# Patient Record
Sex: FEMALE | Race: WHITE | NOT HISPANIC OR LATINO | Employment: FULL TIME | URBAN - METROPOLITAN AREA
[De-identification: names, ages, dates, MRNs, and addresses within clinical notes are randomized per-mention and may not be internally consistent; named-entity substitution may affect disease eponyms.]

---

## 2017-03-10 ENCOUNTER — GENERIC CONVERSION - ENCOUNTER (OUTPATIENT)
Dept: OTHER | Facility: OTHER | Age: 52
End: 2017-03-10

## 2017-10-16 ENCOUNTER — ALLSCRIPTS OFFICE VISIT (OUTPATIENT)
Dept: OTHER | Facility: OTHER | Age: 52
End: 2017-10-16

## 2017-10-16 DIAGNOSIS — Z12.31 ENCOUNTER FOR SCREENING MAMMOGRAM FOR MALIGNANT NEOPLASM OF BREAST: ICD-10-CM

## 2017-12-05 ENCOUNTER — HOSPITAL ENCOUNTER (OUTPATIENT)
Dept: MAMMOGRAPHY | Facility: MEDICAL CENTER | Age: 52
Discharge: HOME | End: 2017-12-05
Payer: COMMERCIAL

## 2017-12-05 DIAGNOSIS — Z12.31 ENCOUNTER FOR SCREENING MAMMOGRAM FOR MALIGNANT NEOPLASM OF BREAST: ICD-10-CM

## 2017-12-05 PROCEDURE — G0202 SCR MAMMO BI INCL CAD: HCPCS

## 2017-12-05 PROCEDURE — 77063 BREAST TOMOSYNTHESIS BI: CPT

## 2017-12-12 DIAGNOSIS — R92.8 OTHER ABNORMAL AND INCONCLUSIVE FINDINGS ON DIAGNOSTIC IMAGING OF BREAST: ICD-10-CM

## 2017-12-13 ENCOUNTER — CONVERSION ENCOUNTER (OUTPATIENT)
Dept: MAMMOGRAPHY | Facility: CLINIC | Age: 52
End: 2017-12-13

## 2017-12-13 ENCOUNTER — HOSPITAL ENCOUNTER (OUTPATIENT)
Dept: ULTRASOUND IMAGING | Facility: CLINIC | Age: 52
Discharge: HOME | End: 2017-12-13
Payer: COMMERCIAL

## 2017-12-13 ENCOUNTER — HOSPITAL ENCOUNTER (OUTPATIENT)
Dept: MAMMOGRAPHY | Facility: CLINIC | Age: 52
Discharge: HOME | End: 2017-12-13
Payer: COMMERCIAL

## 2017-12-13 ENCOUNTER — TRANSCRIBE ORDERS (OUTPATIENT)
Dept: MAMMOGRAPHY | Facility: CLINIC | Age: 52
End: 2017-12-13

## 2017-12-13 ENCOUNTER — GENERIC CONVERSION - ENCOUNTER (OUTPATIENT)
Dept: OTHER | Facility: OTHER | Age: 52
End: 2017-12-13

## 2017-12-13 DIAGNOSIS — N63.0 LUMP OR MASS IN BREAST: Primary | ICD-10-CM

## 2017-12-13 DIAGNOSIS — R92.8 OTHER ABNORMAL AND INCONCLUSIVE FINDINGS ON DIAGNOSTIC IMAGING OF BREAST: ICD-10-CM

## 2017-12-13 DIAGNOSIS — R92.8 ABNORMAL MAMMOGRAM: ICD-10-CM

## 2017-12-13 PROCEDURE — 76642 ULTRASOUND BREAST LIMITED: CPT

## 2017-12-22 ENCOUNTER — GENERIC CONVERSION - ENCOUNTER (OUTPATIENT)
Dept: OTHER | Facility: OTHER | Age: 52
End: 2017-12-22

## 2018-01-11 NOTE — RESULT NOTES
Message   Thyroid levels looked okay  No changes recommended at this time  Verified Results  (1) TSH WITH FT4 REFLEX 10Oct2016 06:09PM Apoorva Velasco     Test Name Result Flag Reference   TSH 0 392 uIU/mL  0 358-3 740   Patients undergoing fluorescein dye angiography may retain small amounts of fluorescein in the body for 48-72 hours post procedure  Samples containing fluorescein can produce falsely depressed TSH values  If the patient had this procedure,a specimen should be resubmitted post fluorescein clearance            The recommended reference ranges for TSH during pregnancy are as follows:  First trimester 0 1 to 2 5 uIU/mL  Second trimester  0 2 to 3 0 uIU/mL  Third trimester 0 3 to 3 0 uIU/m

## 2018-01-13 NOTE — PROGRESS NOTES
Assessment    1  Encounter for preventive health examination (V70 0) (Z00 00)    Plan  Colon cancer screening    · COLONOSCOPY; Status:Active - Retrospective By Protocol Authorization; Requested  for:[id number];   Generalized anxiety disorder    · From  FLUoxetine HCl - 20 MG Oral Capsule TAKE 1 CAPSULE DAILY for  anxiety To FLUoxetine HCl - 20 MG Oral Capsule take 1 capsule by mouth once daily  Visit for screening mammogram    · MAMMO SCREENING BILATERAL W 3D & CAD; Status:Hold For -  Scheduling,Retrospective By Protocol Authorization; Requested for:[id number];     Discussion/Summary  health maintenance visit the risks and benefits of cervical cancer screening were discussed cervical cancer screening is current      Chief Complaint  Physical      History of Present Illness  HM, Adult Female: The patient is being seen for a health maintenance evaluation  General Health: The patient's health since the last visit is described as good  She has regular dental visits  She denies vision problems  She denies hearing loss  Immunizations status: up to date  Lifestyle:  She consumes a diverse and healthy diet  She does not have any weight concerns  She exercises regularly  She does not use tobacco  She denies alcohol use  She denies drug use  Reproductive health:  she reports normal menses  Screening: cancer screening reviewed and current  metabolic screening reviewed and current  risk screening reviewed and current  Review of Systems    Constitutional: No fever, no chills, feels well, no tiredness, no recent weight gain or weight loss  Eyes: No complaints of eye pain, no red eyes, no eyesight problems, no discharge, no dry eyes, no itching of eyes  ENT: no complaints of earache, no loss of hearing, no nose bleeds, no nasal discharge, no sore throat, no hoarseness     Cardiovascular: No complaints of slow heart rate, no fast heart rate, no chest pain, no palpitations, no leg claudication, no lower extremity edema  Respiratory: No complaints of shortness of breath, no wheezing, no cough, no SOB on exertion, no orthopnea, no PND  Gastrointestinal: No complaints of abdominal pain, no constipation, no nausea or vomiting, no diarrhea, no bloody stools  Genitourinary: No complaints of dysuria, no incontinence, no pelvic pain, no dysmenorrhea, no vaginal discharge or bleeding  Musculoskeletal: No complaints of arthralgias, no myalgias, no joint swelling or stiffness, no limb pain or swelling  Integumentary: No complaints of skin rash or lesions, no itching, no skin wounds, no breast pain or lump  Neurological: No complaints of headache, no confusion, no convulsions, no numbness, no dizziness or fainting, no tingling, no limb weakness, no difficulty walking  Psychiatric: Not suicidal, no sleep disturbance, no anxiety or depression, no change in personality, no emotional problems  Endocrine: No complaints of proptosis, no hot flashes, no muscle weakness, no deepening of the voice, no feelings of weakness  Hematologic/Lymphatic: No complaints of swollen glands, no swollen glands in the neck, does not bleed easily, does not bruise easily  Active Problems    1  Benign paroxysmal vertigo, unspecified laterality (386 11) (H81 10)   2  Cervical cancer screening (V76 2) (Z12 4)   3  Chest pain (786 50) (R07 9)   4  Closed nondisplaced fracture of medial condyle of right humerus, initial encounter   (812 43) (S42 464A)   5  Colon cancer screening (V76 51) (Z12 11)   6  Contact dermatitis (692 9) (L25 9)   7  Epicondylitis elbow, medial, right (726 31) (M77 01)   8  Generalized anxiety disorder (300 02) (F41 1)   9  Hashimoto's thyroiditis (245 2) (E06 3)   10  Hypothyroidism (244 9) (E03 9)   11  Lung mass (786 6) (R91 8)   12  Lung nodules (793 19) (R91 8)   13  Panic disorder without agoraphobia with mild panic attacks (300 01) (F41 0)   14  Pre-employment examination (V70 5) (Z02 1)   15   Puncture Wound Of Foot (892 0)   16  Right elbow pain (719 42) (M25 521)   17  Solar dermatitis (692 70) (L57 8)   18  Stress incontinence, female (625 6) (N39 3)   19  Tinea corporis (110 5) (B35 4)   20  Viral gastroenteritis (008 8) (A08 4)   21  Visit for screening mammogram (V76 12) (Z12 31)    Past Medical History    · Acute bronchitis (466 0) (J20 9)   · Acute sinusitis (461 9) (J01 90)   · Acute sinusitis (461 9) (J01 90)    Surgical History    · History of Salpingo-oophorectomy Bilateral   · History of Total Abdominal Hysterectomy    Family History  Mother    · No pertinent family history  Father    · No pertinent family history    Social History    · Current Every Day Smoker (305 1)   · Current tobacco use (305 1) (Z72 0)   · Never Drank Alcohol    Current Meds   1  ALPRAZolam 0 25 MG Oral Tablet; 1 tablet every 8 hours as needed for anxiety; Therapy: 55Sxb6096 to (Last Rx:11Nov2015) Ordered   2  FLUoxetine HCl - 20 MG Oral Capsule; TAKE 1 CAPSULE DAILY for anxiety; Therapy: 30RDA7533 to (96 240274)  Requested for: 24QGD0212; Last   Rx:10Oct2016 Ordered   3  Levothyroxine Sodium 100 MCG Oral Tablet; TAKE 1 TABLET DAILY AS DIRECTED    Requested for: 93Sem1093; Last Rx:87Zou3419 Ordered   4  Meloxicam 15 MG Oral Tablet; TAKE 1 TABLET DAILY AS NEEDED FOR PAIN WITH   FOOD; Therapy: 86NDP5631 to (Last Rx:47Tln9548)  Requested for: 53Gko0000 Ordered   5  Topiramate 25 MG Oral Tablet; 4 tabs qd until rx completed; Therapy: (Recorded:23Wob7085) to Recorded    Allergies    1  No Known Drug Allergies    Vitals   Recorded: 36DNL6538 04:03PM   Temperature 92 9 F   Systolic 389   Diastolic 80   Height 5 ft 0 5 in   Weight 154 lb    BMI Calculated 29 58   BSA Calculated 1 68     Physical Exam    Constitutional   General appearance: No acute distress, well appearing and well nourished  Eyes   Conjunctiva and lids: No swelling, erythema or discharge  Pupils and irises: Equal, round and reactive to light  Ears, Nose, Mouth, and Throat   External inspection of ears and nose: Normal     Otoscopic examination: Tympanic membranes translucent with normal light reflex  Canals patent without erythema  Oropharynx: Normal with no erythema, edema, exudate or lesions  Pulmonary   Respiratory effort: No increased work of breathing or signs of respiratory distress  Auscultation of lungs: Clear to auscultation  Cardiovascular   Palpation of heart: Normal PMI, no thrills  Auscultation of heart: Normal rate and rhythm, normal S1 and S2, without murmurs  Examination of extremities for edema and/or varicosities: Normal     Abdomen   Abdomen: Non-tender, no masses  Liver and spleen: No hepatomegaly or splenomegaly  Lymphatic   Palpation of lymph nodes in neck: No lymphadenopathy  Musculoskeletal   Gait and station: Normal     Digits and nails: Normal without clubbing or cyanosis  Inspection/palpation of joints, bones, and muscles: Normal     Skin   Skin and subcutaneous tissue: Normal without rashes or lesions  Neurologic   Cranial nerves: Cranial nerves 2-12 intact  Reflexes: 2+ and symmetric  Sensation: No sensory loss  Psychiatric   Orientation to person, place, and time: Normal     Mood and affect: Normal        Health Management  Cervical cancer screening   (1) THIN PREP PAP WITH IMAGING; every 3 years; Next Due: 83QXD5983; Overdue  Colon cancer screening   COLONOSCOPY; every 10 years; Next Due: 54VGH4083; Overdue  Visit for screening mammogram   Digital Bilateral Screening Mammogram With CAD; every 1 year; Last 12OOP6601; Next  Due: 20BQG5676;  Overdue    Signatures   Electronically signed by : Javan Mclcain DO; Oct 16 2017  4:29PM EST                       (Author)

## 2018-01-14 VITALS
HEIGHT: 61 IN | BODY MASS INDEX: 29.07 KG/M2 | SYSTOLIC BLOOD PRESSURE: 120 MMHG | DIASTOLIC BLOOD PRESSURE: 80 MMHG | TEMPERATURE: 97.9 F | WEIGHT: 154 LBS

## 2018-01-23 NOTE — RESULT NOTES
Discussion/Summary   Ultrasound shows what appears to be two complex cysts  Repeat ultrasound recommended for 6 months  Verified Results  *US BREAST LEFT LIMITED (DIAGNOSTIC) 52PEF4253 11:51AM Lambert Quintana     Test Name Result Flag Reference   US BREAST LEFT LIMITED (Report)     Patient History:   No known family history of cancer  No Hormone Replacement Therapy   Patient is an every day smoker  Patient's BMI is 29 7  Reason for exam: addl evaluation requested from abnormal    screening  Callback from screening mammogram     Mammo Diagnostic Left W DBT and CAD: December 13, 2017 - Check In   #: [de-identified]     CC and MLO view(s) were taken of the left breast        Technologist: Rayo Chavez RT(R)(M)   The breast tissue is almost entirely fat  The screening    mammogram report described a subcentimeter mass at the 12 to 1    o'clock position left breast  Tomographic images demonstrate    persistence of this mass within the upper outer left breast    anterior 3rd in depth  A 2nd subcentimeter similar-appearing    mass is also present in the adjacent upper inner left breast    anterior 3rd in depth  Further evaluation of the upper outer    quadrant with ultrasound is recommended  Targeted sonographic evaluation of the 12 o'clock position left    breast 7 cm from the nipple reveals a hypoechoic lesion measuring   4 x 2 x 3 mm with a few low level internal echoes  No definite    hilar flow to suggest a lymph node  There is an adjacent vessel  Findings may represent a small complicated cyst versus lymph    node  Six-month follow-up ultrasound is recommended to confirm    stability/evaluate for resolution  At the 12 o'clock position 3 to 4 cm from the nipple there is a    hypoechoic lesion measuring 4 x 3 x 2 mm  This has low level    internal echoes without definite hilar flow  Findings also are    suggestive of a small complicated cyst versus lymph node      Six-month follow-up ultrasound is recommended to confirm    stability/evaluate for resolution of this finding  US Breast Left Limited: December 13, 2017 - Check In #: [de-identified]   Standard views  Technologist: Jessica Calle RDMS   Fat lobules and uniformly echogenic bands of supporting    structures (Saurav's ligaments) comprise the bulk of breast    tissue  ACR BI-RADSï¾® Assessments: BiRad:3 - Probably Benign (Overall)   Left breast Lt Diag Mammo: BiRad:3 - probably benign finding in    the left breast    Left breast Left Brst US: BiRad:3 - probably benign finding in    the left breast      Recommendation:   Ultrasound of the left breast in 6 months  The patient is scheduled in a reminder system for screening    mammography       Transcription Location: [facility] 98: [id number]     Risk Value(s):   Tyrer-Cuzick 10 Year: 1 900%, Tyrer-Cuzick Lifetime: 6 900%,    Myriad Table: 1 5%, IRWIN 5 Year: 0 9%, NCI Lifetime: 7 0%   Signed by:   Sven Garcia MD   12/13/17

## 2018-02-22 ENCOUNTER — OFFICE VISIT (OUTPATIENT)
Dept: FAMILY MEDICINE CLINIC | Facility: CLINIC | Age: 53
End: 2018-02-22
Payer: COMMERCIAL

## 2018-02-22 VITALS
WEIGHT: 293 LBS | DIASTOLIC BLOOD PRESSURE: 88 MMHG | SYSTOLIC BLOOD PRESSURE: 138 MMHG | TEMPERATURE: 97.3 F | HEIGHT: 60 IN | BODY MASS INDEX: 57.52 KG/M2

## 2018-02-22 DIAGNOSIS — G43.909 MIGRAINE WITHOUT STATUS MIGRAINOSUS, NOT INTRACTABLE, UNSPECIFIED MIGRAINE TYPE: ICD-10-CM

## 2018-02-22 DIAGNOSIS — E03.9 HYPOTHYROIDISM, UNSPECIFIED TYPE: ICD-10-CM

## 2018-02-22 DIAGNOSIS — F41.1 GENERALIZED ANXIETY DISORDER: Primary | ICD-10-CM

## 2018-02-22 PROCEDURE — 99214 OFFICE O/P EST MOD 30 MIN: CPT | Performed by: FAMILY MEDICINE

## 2018-02-22 PROCEDURE — 3008F BODY MASS INDEX DOCD: CPT | Performed by: FAMILY MEDICINE

## 2018-02-22 RX ORDER — LEVOTHYROXINE SODIUM 0.1 MG/1
100 TABLET ORAL DAILY
Qty: 90 TABLET | Refills: 3 | Status: SHIPPED | OUTPATIENT
Start: 2018-02-22 | End: 2018-09-18 | Stop reason: SDUPTHER

## 2018-02-22 RX ORDER — FLUOXETINE HYDROCHLORIDE 20 MG/1
1 CAPSULE ORAL DAILY
COMMUNITY
Start: 2016-10-10 | End: 2018-02-22 | Stop reason: SDUPTHER

## 2018-02-22 RX ORDER — RIZATRIPTAN BENZOATE 10 MG/1
10 TABLET, ORALLY DISINTEGRATING ORAL ONCE AS NEEDED
Qty: 9 TABLET | Refills: 3 | Status: SHIPPED | OUTPATIENT
Start: 2018-02-22 | End: 2018-09-17

## 2018-02-22 RX ORDER — LEVOTHYROXINE SODIUM 0.1 MG/1
1 TABLET ORAL DAILY
COMMUNITY
End: 2018-02-22 | Stop reason: SDUPTHER

## 2018-02-22 RX ORDER — FLUOXETINE HYDROCHLORIDE 20 MG/1
20 CAPSULE ORAL DAILY
Qty: 90 CAPSULE | Refills: 3 | Status: SHIPPED | OUTPATIENT
Start: 2018-02-22 | End: 2019-02-06 | Stop reason: SDUPTHER

## 2018-06-20 ENCOUNTER — TRANSCRIBE ORDERS (OUTPATIENT)
Dept: MAMMOGRAPHY | Facility: CLINIC | Age: 53
End: 2018-06-20

## 2018-06-20 ENCOUNTER — HOSPITAL ENCOUNTER (OUTPATIENT)
Dept: ULTRASOUND IMAGING | Facility: CLINIC | Age: 53
Discharge: HOME | End: 2018-06-20
Payer: COMMERCIAL

## 2018-06-20 DIAGNOSIS — R92.8 ABNORMAL MAMMOGRAM: Primary | ICD-10-CM

## 2018-06-20 DIAGNOSIS — R92.8 OTHER ABNORMAL AND INCONCLUSIVE FINDINGS ON DIAGNOSTIC IMAGING OF BREAST: ICD-10-CM

## 2018-06-20 PROCEDURE — 76642 ULTRASOUND BREAST LIMITED: CPT

## 2018-07-02 ENCOUNTER — TELEPHONE (OUTPATIENT)
Dept: FAMILY MEDICINE CLINIC | Facility: CLINIC | Age: 53
End: 2018-07-02

## 2018-07-02 NOTE — TELEPHONE ENCOUNTER
Spoke with patient for clarification of results  ----- Message from Deyvi Westfall DO sent at 6/20/2018  9:33 AM EDT -----  Left breast US " probably benign "  Recommend diagnostic Mammogram and Left Breast US in 6 months

## 2018-09-17 ENCOUNTER — OFFICE VISIT (OUTPATIENT)
Dept: FAMILY MEDICINE CLINIC | Facility: CLINIC | Age: 53
End: 2018-09-17
Payer: COMMERCIAL

## 2018-09-17 VITALS
TEMPERATURE: 97.9 F | WEIGHT: 141 LBS | DIASTOLIC BLOOD PRESSURE: 82 MMHG | SYSTOLIC BLOOD PRESSURE: 122 MMHG | BODY MASS INDEX: 27.54 KG/M2

## 2018-09-17 DIAGNOSIS — R73.09 ELEVATED GLUCOSE: ICD-10-CM

## 2018-09-17 DIAGNOSIS — E03.9 HYPOTHYROIDISM, UNSPECIFIED TYPE: ICD-10-CM

## 2018-09-17 DIAGNOSIS — Z00.00 WELL ADULT EXAM: Primary | ICD-10-CM

## 2018-09-17 LAB
EST. AVERAGE GLUCOSE BLD GHB EST-MCNC: 148 MG/DL
HBA1C MFR BLD: 6.8 % (ref 4.2–6.3)

## 2018-09-17 PROCEDURE — 83036 HEMOGLOBIN GLYCOSYLATED A1C: CPT | Performed by: FAMILY MEDICINE

## 2018-09-17 PROCEDURE — 84439 ASSAY OF FREE THYROXINE: CPT | Performed by: FAMILY MEDICINE

## 2018-09-17 PROCEDURE — 99396 PREV VISIT EST AGE 40-64: CPT | Performed by: FAMILY MEDICINE

## 2018-09-17 PROCEDURE — 84443 ASSAY THYROID STIM HORMONE: CPT | Performed by: FAMILY MEDICINE

## 2018-09-18 DIAGNOSIS — E03.9 HYPOTHYROIDISM, UNSPECIFIED TYPE: ICD-10-CM

## 2018-09-18 LAB
T4 FREE SERPL-MCNC: 1.08 NG/DL (ref 0.76–1.46)
TSH SERPL DL<=0.05 MIU/L-ACNC: 0.32 UIU/ML (ref 0.36–3.74)

## 2018-09-18 RX ORDER — LEVOTHYROXINE SODIUM 88 UG/1
88 TABLET ORAL DAILY
Qty: 90 TABLET | Refills: 1 | Status: SHIPPED | OUTPATIENT
Start: 2018-09-18 | End: 2019-03-03 | Stop reason: SDUPTHER

## 2018-10-03 NOTE — PROGRESS NOTES
Assessment/Plan:  Anticipatory guidance provided  Recommend return to office for recheck in 1 year  Recommend annual mammography  Colon cancer screening every 10 years with colonoscopy  No problem-specific Assessment & Plan notes found for this encounter  Diagnoses and all orders for this visit:    Well adult exam    Hypothyroidism, unspecified type  -     TSH, 3rd generation with Free T4 reflex  -     T4, free; Future  -     T4, free    Elevated glucose  -     Hemoglobin A1C    Other orders  -     Multiple Vitamins-Minerals (CENTRUM ADULTS PO); Centrum          Subjective:      Patient ID: Annie Sadler is a 46 y o  female  Patient is here for well check  She is generally feeling well  She is due for mammography  She has history of hypothyroidism and is due for blood testing  No other significant concerns today  The following portions of the patient's history were reviewed and updated as appropriate: allergies, current medications, past family history, past medical history, past social history, past surgical history and problem list     Review of Systems   Constitutional: Negative  HENT: Negative  Eyes: Negative  Respiratory: Negative  Cardiovascular: Negative  Gastrointestinal: Negative  Endocrine: Negative  Genitourinary: Negative  Musculoskeletal: Negative  Skin: Negative  Allergic/Immunologic: Negative  Neurological: Negative  Hematological: Negative  Psychiatric/Behavioral: Negative  Objective:      /82   Temp 97 9 °F (36 6 °C)   Wt 64 kg (141 lb)   BMI 27 54 kg/m²          Physical Exam   Constitutional: She is oriented to person, place, and time  She appears well-developed and well-nourished  HENT:   Head: Normocephalic and atraumatic  Right Ear: External ear normal  Tympanic membrane is not erythematous and not bulging  Left Ear: External ear normal  Tympanic membrane is not erythematous and not bulging     Nose: Nose normal    Mouth/Throat: Oropharynx is clear and moist and mucous membranes are normal  No oral lesions  No oropharyngeal exudate  Eyes: Conjunctivae and EOM are normal  Right eye exhibits no discharge  Left eye exhibits no discharge  No scleral icterus  Neck: Normal range of motion  Neck supple  No thyromegaly present  Cardiovascular: Normal rate, regular rhythm and normal heart sounds  Exam reveals no gallop and no friction rub  No murmur heard  Pulmonary/Chest: Effort normal  No respiratory distress  She has no wheezes  She has no rales  She exhibits no tenderness  Abdominal: Soft  Bowel sounds are normal  She exhibits no distension and no mass  There is no tenderness  There is no rebound and no guarding  Musculoskeletal: Normal range of motion  She exhibits no edema, tenderness or deformity  Lymphadenopathy:     She has no cervical adenopathy  Neurological: She is alert and oriented to person, place, and time  She has normal reflexes  No cranial nerve deficit  She exhibits normal muscle tone  Coordination normal    Skin: Skin is warm and dry  No rash noted  No erythema  No pallor  Psychiatric: She has a normal mood and affect  Her behavior is normal    Vitals reviewed

## 2019-01-02 ENCOUNTER — TELEPHONE (OUTPATIENT)
Dept: FAMILY MEDICINE CLINIC | Facility: CLINIC | Age: 54
End: 2019-01-02

## 2019-01-04 ENCOUNTER — TELEPHONE (OUTPATIENT)
Dept: FAMILY MEDICINE CLINIC | Facility: CLINIC | Age: 54
End: 2019-01-04

## 2019-01-04 NOTE — TELEPHONE ENCOUNTER
Called patient and left message that health screening [i e , colonoscopy, done usually every 10 years or FIT test, done every year] is due and to call the office to discuss  When pt calls back, please ask for pt's preferred test and then message PCP for referral to GI or place order for FIT test and ask pt to come in for the kit

## 2019-02-06 DIAGNOSIS — F41.1 GENERALIZED ANXIETY DISORDER: ICD-10-CM

## 2019-02-06 RX ORDER — FLUOXETINE HYDROCHLORIDE 20 MG/1
20 CAPSULE ORAL DAILY
Qty: 90 CAPSULE | Refills: 3 | Status: SHIPPED | OUTPATIENT
Start: 2019-02-06 | End: 2019-12-11 | Stop reason: SDUPTHER

## 2019-03-03 DIAGNOSIS — E03.9 HYPOTHYROIDISM, UNSPECIFIED TYPE: ICD-10-CM

## 2019-03-03 RX ORDER — LEVOTHYROXINE SODIUM 88 UG/1
TABLET ORAL
Qty: 90 TABLET | Refills: 1 | Status: SHIPPED | OUTPATIENT
Start: 2019-03-03 | End: 2019-12-11 | Stop reason: SDUPTHER

## 2019-03-25 ENCOUNTER — TELEPHONE (OUTPATIENT)
Dept: FAMILY MEDICINE CLINIC | Facility: CLINIC | Age: 54
End: 2019-03-25

## 2019-03-27 ENCOUNTER — TRANSITIONAL CARE MANAGEMENT (OUTPATIENT)
Dept: FAMILY MEDICINE CLINIC | Facility: CLINIC | Age: 54
End: 2019-03-27

## 2019-06-21 ENCOUNTER — TELEPHONE (OUTPATIENT)
Dept: FAMILY MEDICINE CLINIC | Facility: CLINIC | Age: 54
End: 2019-06-21

## 2019-12-11 ENCOUNTER — TELEPHONE (OUTPATIENT)
Dept: FAMILY MEDICINE CLINIC | Facility: CLINIC | Age: 54
End: 2019-12-11

## 2019-12-11 ENCOUNTER — OFFICE VISIT (OUTPATIENT)
Dept: FAMILY MEDICINE CLINIC | Facility: CLINIC | Age: 54
End: 2019-12-11
Payer: COMMERCIAL

## 2019-12-11 VITALS
TEMPERATURE: 97.6 F | WEIGHT: 147 LBS | SYSTOLIC BLOOD PRESSURE: 120 MMHG | OXYGEN SATURATION: 99 % | HEIGHT: 60 IN | BODY MASS INDEX: 28.86 KG/M2 | DIASTOLIC BLOOD PRESSURE: 82 MMHG | HEART RATE: 96 BPM

## 2019-12-11 DIAGNOSIS — E03.9 HYPOTHYROIDISM, UNSPECIFIED TYPE: ICD-10-CM

## 2019-12-11 DIAGNOSIS — Z12.11 ENCOUNTER FOR SCREENING COLONOSCOPY: ICD-10-CM

## 2019-12-11 DIAGNOSIS — F41.1 GENERALIZED ANXIETY DISORDER: ICD-10-CM

## 2019-12-11 DIAGNOSIS — Z12.11 COLON CANCER SCREENING: ICD-10-CM

## 2019-12-11 DIAGNOSIS — Z12.39 SCREENING FOR BREAST CANCER: ICD-10-CM

## 2019-12-11 DIAGNOSIS — Z00.00 WELL ADULT EXAM: Primary | ICD-10-CM

## 2019-12-11 PROCEDURE — 99396 PREV VISIT EST AGE 40-64: CPT | Performed by: FAMILY MEDICINE

## 2019-12-11 RX ORDER — LEVOTHYROXINE SODIUM 88 UG/1
88 TABLET ORAL DAILY
Qty: 90 TABLET | Refills: 3 | Status: SHIPPED | OUTPATIENT
Start: 2019-12-11 | End: 2020-11-10 | Stop reason: SDUPTHER

## 2019-12-11 RX ORDER — FLUOXETINE HYDROCHLORIDE 20 MG/1
20 CAPSULE ORAL DAILY
Qty: 90 CAPSULE | Refills: 3 | Status: SHIPPED | OUTPATIENT
Start: 2019-12-11 | End: 2020-11-10 | Stop reason: SDUPTHER

## 2019-12-11 NOTE — PROGRESS NOTES
Assessment/Plan:  Anticipatory guidance provided  Recommend colonoscopy and mammography  Refill on her thyroid medication on fluoxetine also given  Recommend recheck in 6-12 months  No problem-specific Assessment & Plan notes found for this encounter  Diagnoses and all orders for this visit:    Well adult exam    Encounter for screening colonoscopy  -     Ambulatory referral for colonoscopy; Future    Hypothyroidism, unspecified type  -     levothyroxine 88 mcg tablet; Take 1 tablet (88 mcg total) by mouth daily    Generalized anxiety disorder  -     FLUoxetine (PROzac) 20 mg capsule; Take 1 capsule (20 mg total) by mouth daily    Colon cancer screening  -     Cologuard; Future    Screening for breast cancer  -     Mammo screening bilateral w 3d & cad; Future          Subjective:      Patient ID: Yuki Escobar is a 53 y o  female  Patient is here for annual well check  She is due for colonoscopy for colon cancer screen  She is otherwise feeling well  She is due for mammogram as well  The following portions of the patient's history were reviewed and updated as appropriate: allergies, current medications, past family history, past medical history, past social history, past surgical history and problem list     Review of Systems   Constitutional: Negative  HENT: Negative  Eyes: Negative  Respiratory: Negative  Cardiovascular: Negative  Gastrointestinal: Negative  Endocrine: Negative  Genitourinary: Negative  Musculoskeletal: Negative  Skin: Negative  Allergic/Immunologic: Negative  Neurological: Negative  Hematological: Negative  Psychiatric/Behavioral: Negative            Objective:      /82 (BP Location: Left arm, Patient Position: Sitting, Cuff Size: Standard)   Pulse 96   Temp 97 6 °F (36 4 °C) (Tympanic)   Ht 5' (1 524 m)   Wt 66 7 kg (147 lb)   LMP  (LMP Unknown)   SpO2 99%   BMI 28 71 kg/m²          Physical Exam   Constitutional: She is oriented to person, place, and time  She appears well-developed and well-nourished  HENT:   Head: Normocephalic and atraumatic  Right Ear: External ear normal  Tympanic membrane is not erythematous and not bulging  Left Ear: External ear normal  Tympanic membrane is not erythematous and not bulging  Nose: Nose normal    Mouth/Throat: Oropharynx is clear and moist and mucous membranes are normal  No oral lesions  No oropharyngeal exudate  Eyes: Conjunctivae and EOM are normal  Right eye exhibits no discharge  Left eye exhibits no discharge  No scleral icterus  Neck: Normal range of motion  Neck supple  No thyromegaly present  Cardiovascular: Normal rate, regular rhythm and normal heart sounds  Exam reveals no gallop and no friction rub  No murmur heard  Pulmonary/Chest: Effort normal  No respiratory distress  She has no wheezes  She has no rales  She exhibits no tenderness  Abdominal: Soft  Bowel sounds are normal  She exhibits no distension and no mass  There is no tenderness  There is no rebound and no guarding  Musculoskeletal: Normal range of motion  She exhibits no edema, tenderness or deformity  Lymphadenopathy:     She has no cervical adenopathy  Neurological: She is alert and oriented to person, place, and time  She has normal reflexes  No cranial nerve deficit  She exhibits normal muscle tone  Coordination normal    Skin: Skin is warm and dry  No rash noted  No erythema  No pallor  Psychiatric: She has a normal mood and affect  Her behavior is normal    Vitals reviewed

## 2019-12-11 NOTE — TELEPHONE ENCOUNTER
Patient in for appointment today  Dr Sergey Potter filled out req form for Cologuard; however, patient prefers to do colonoscopy  We discussed that I would complete form and upload to media docs in case she changes her mind  She will need to complete insurance billing and sign form before we send it

## 2020-02-04 ENCOUNTER — OFFICE VISIT (OUTPATIENT)
Dept: FAMILY MEDICINE CLINIC | Facility: CLINIC | Age: 55
End: 2020-02-04
Payer: COMMERCIAL

## 2020-02-04 VITALS
SYSTOLIC BLOOD PRESSURE: 130 MMHG | HEIGHT: 60 IN | HEART RATE: 86 BPM | RESPIRATION RATE: 18 BRPM | OXYGEN SATURATION: 98 % | DIASTOLIC BLOOD PRESSURE: 60 MMHG | TEMPERATURE: 98.9 F | BODY MASS INDEX: 29.06 KG/M2 | WEIGHT: 148 LBS

## 2020-02-04 DIAGNOSIS — J01.00 ACUTE NON-RECURRENT MAXILLARY SINUSITIS: Primary | ICD-10-CM

## 2020-02-04 PROCEDURE — 3008F BODY MASS INDEX DOCD: CPT | Performed by: FAMILY MEDICINE

## 2020-02-04 PROCEDURE — 99213 OFFICE O/P EST LOW 20 MIN: CPT | Performed by: FAMILY MEDICINE

## 2020-02-04 RX ORDER — LEVOFLOXACIN 500 MG/1
500 TABLET, FILM COATED ORAL EVERY 24 HOURS
Qty: 7 TABLET | Refills: 0 | Status: SHIPPED | OUTPATIENT
Start: 2020-02-04 | End: 2020-02-11

## 2020-02-04 NOTE — PROGRESS NOTES
BMI Counseling: Body mass index is 28 9 kg/m²  The BMI is above normal  Nutrition recommendations include decreasing portion sizes  Exercise recommendations include moderate physical activity 150 minutes/week  Depression Screening and Follow-up Plan: Clincally patient does not have depression  No treatment is required  Assessment/Plan:  Side effect profile medication reviewed  Recommend return to office if no improvement or worsening symptoms  Consider chest x-ray if any worsening shortness of breath or fevers  No problem-specific Assessment & Plan notes found for this encounter  Diagnoses and all orders for this visit:    Acute non-recurrent maxillary sinusitis  -     levofloxacin (LEVAQUIN) 500 mg tablet; Take 1 tablet (500 mg total) by mouth every 24 hours for 7 days          Subjective:      Patient ID: Boogie Villalba is a 53 y o  female  Patient is here with cough and congestion and sinus pressure over the last 2 weeks  Symptoms are mild-to-moderate  No GI complaints  Denies any high fevers  No severe shortness of breath  The following portions of the patient's history were reviewed and updated as appropriate: allergies, current medications, past family history, past medical history, past social history, past surgical history and problem list     Review of Systems   Constitutional: Negative for fever  HENT: Positive for congestion and sinus pressure  Respiratory: Positive for cough  Objective:      /60 (BP Location: Left arm, Patient Position: Sitting, Cuff Size: Adult)   Pulse 86   Temp 98 9 °F (37 2 °C) (Tympanic)   Resp 18   Ht 5' (1 524 m)   Wt 67 1 kg (148 lb)   LMP  (LMP Unknown)   SpO2 98% Comment: Room air  BMI 28 90 kg/m²          Physical Exam   Constitutional: She is oriented to person, place, and time  She appears well-developed and well-nourished  HENT:   Head: Normocephalic and atraumatic     Right Ear: External ear normal  Tympanic membrane is not erythematous and not bulging  Left Ear: External ear normal  Tympanic membrane is not erythematous and not bulging  Nose: Nose normal    Mouth/Throat: Oropharynx is clear and moist and mucous membranes are normal  No oral lesions  No oropharyngeal exudate  Eyes: Conjunctivae and EOM are normal  Right eye exhibits no discharge  Left eye exhibits no discharge  No scleral icterus  Neck: Normal range of motion  Neck supple  No thyromegaly present  Cardiovascular: Normal rate, regular rhythm and normal heart sounds  Exam reveals no gallop and no friction rub  No murmur heard  Pulmonary/Chest: Effort normal  No respiratory distress  She has no wheezes  She has no rales  She exhibits no tenderness  Abdominal: Soft  Bowel sounds are normal  She exhibits no distension and no mass  There is no tenderness  There is no rebound and no guarding  Musculoskeletal: Normal range of motion  She exhibits no edema, tenderness or deformity  Lymphadenopathy:     She has no cervical adenopathy  Neurological: She is alert and oriented to person, place, and time  She has normal reflexes  No cranial nerve deficit  She exhibits normal muscle tone  Coordination normal    Skin: Skin is warm and dry  No rash noted  No erythema  No pallor  Psychiatric: She has a normal mood and affect  Her behavior is normal    Vitals reviewed

## 2020-11-03 ENCOUNTER — TELEPHONE (OUTPATIENT)
Dept: FAMILY MEDICINE CLINIC | Facility: CLINIC | Age: 55
End: 2020-11-03

## 2020-11-10 ENCOUNTER — OFFICE VISIT (OUTPATIENT)
Dept: FAMILY MEDICINE CLINIC | Facility: CLINIC | Age: 55
End: 2020-11-10
Payer: COMMERCIAL

## 2020-11-10 VITALS
OXYGEN SATURATION: 99 % | BODY MASS INDEX: 26.81 KG/M2 | HEIGHT: 61 IN | RESPIRATION RATE: 18 BRPM | SYSTOLIC BLOOD PRESSURE: 122 MMHG | WEIGHT: 142 LBS | DIASTOLIC BLOOD PRESSURE: 80 MMHG | TEMPERATURE: 97.4 F | HEART RATE: 96 BPM

## 2020-11-10 DIAGNOSIS — F41.1 GENERALIZED ANXIETY DISORDER: ICD-10-CM

## 2020-11-10 DIAGNOSIS — E03.9 HYPOTHYROIDISM, UNSPECIFIED TYPE: ICD-10-CM

## 2020-11-10 DIAGNOSIS — Z11.4 ENCOUNTER FOR SCREENING FOR HIV: ICD-10-CM

## 2020-11-10 DIAGNOSIS — Z12.31 VISIT FOR SCREENING MAMMOGRAM: Primary | ICD-10-CM

## 2020-11-10 PROCEDURE — 3725F SCREEN DEPRESSION PERFORMED: CPT | Performed by: FAMILY MEDICINE

## 2020-11-10 PROCEDURE — 3008F BODY MASS INDEX DOCD: CPT | Performed by: FAMILY MEDICINE

## 2020-11-10 PROCEDURE — 99396 PREV VISIT EST AGE 40-64: CPT | Performed by: FAMILY MEDICINE

## 2020-11-10 RX ORDER — FLUOXETINE HYDROCHLORIDE 20 MG/1
20 CAPSULE ORAL DAILY
Qty: 90 CAPSULE | Refills: 3 | Status: SHIPPED | OUTPATIENT
Start: 2020-11-10 | End: 2021-04-14 | Stop reason: SDUPTHER

## 2020-11-10 RX ORDER — LEVOTHYROXINE SODIUM 88 UG/1
88 TABLET ORAL DAILY
Qty: 90 TABLET | Refills: 3 | Status: SHIPPED | OUTPATIENT
Start: 2020-11-10 | End: 2020-11-13 | Stop reason: SDUPTHER

## 2020-11-11 LAB
ALBUMIN SERPL-MCNC: 4.5 G/DL (ref 3.6–5.1)
ALBUMIN/GLOB SERPL: 1.5 (CALC) (ref 1–2.5)
ALP SERPL-CCNC: 82 U/L (ref 37–153)
ALT SERPL-CCNC: 15 U/L (ref 6–29)
AST SERPL-CCNC: 15 U/L (ref 10–35)
BASOPHILS # BLD AUTO: 17 CELLS/UL (ref 0–200)
BASOPHILS NFR BLD AUTO: 0.2 %
BILIRUB SERPL-MCNC: 0.3 MG/DL (ref 0.2–1.2)
BUN SERPL-MCNC: 13 MG/DL (ref 7–25)
BUN/CREAT SERPL: ABNORMAL (CALC) (ref 6–22)
CALCIUM SERPL-MCNC: 10 MG/DL (ref 8.6–10.4)
CHLORIDE SERPL-SCNC: 104 MMOL/L (ref 98–110)
CHOLEST SERPL-MCNC: 291 MG/DL
CHOLEST/HDLC SERPL: 7.3 (CALC)
CO2 SERPL-SCNC: 25 MMOL/L (ref 20–32)
CREAT SERPL-MCNC: 0.83 MG/DL (ref 0.5–1.05)
EOSINOPHIL # BLD AUTO: 118 CELLS/UL (ref 15–500)
EOSINOPHIL NFR BLD AUTO: 1.4 %
ERYTHROCYTE [DISTWIDTH] IN BLOOD BY AUTOMATED COUNT: 14.7 % (ref 11–15)
GLOBULIN SER CALC-MCNC: 3.1 G/DL (CALC) (ref 1.9–3.7)
GLUCOSE SERPL-MCNC: 113 MG/DL (ref 65–99)
HCT VFR BLD AUTO: 39.8 % (ref 35–45)
HDLC SERPL-MCNC: 40 MG/DL
HGB BLD-MCNC: 13.7 G/DL (ref 11.7–15.5)
LDLC SERPL CALC-MCNC: 193 MG/DL (CALC)
LYMPHOCYTES # BLD AUTO: 5720 CELLS/UL (ref 850–3900)
LYMPHOCYTES NFR BLD AUTO: 68.1 %
MCH RBC QN AUTO: 30.3 PG (ref 27–33)
MCHC RBC AUTO-ENTMCNC: 34.4 G/DL (ref 32–36)
MCV RBC AUTO: 88.1 FL (ref 80–100)
MONOCYTES # BLD AUTO: 470 CELLS/UL (ref 200–950)
MONOCYTES NFR BLD AUTO: 5.6 %
NEUTROPHILS # BLD AUTO: 2075 CELLS/UL (ref 1500–7800)
NEUTROPHILS NFR BLD AUTO: 24.7 %
NONHDLC SERPL-MCNC: 251 MG/DL (CALC)
PLATELET # BLD AUTO: 290 THOUSAND/UL (ref 140–400)
PMV BLD REES-ECKER: 10.2 FL (ref 7.5–12.5)
POTASSIUM SERPL-SCNC: 4.2 MMOL/L (ref 3.5–5.3)
PROT SERPL-MCNC: 7.6 G/DL (ref 6.1–8.1)
RBC # BLD AUTO: 4.52 MILLION/UL (ref 3.8–5.1)
SL AMB EGFR AFRICAN AMERICAN: 91 ML/MIN/1.73M2
SL AMB EGFR NON AFRICAN AMERICAN: 79 ML/MIN/1.73M2
SODIUM SERPL-SCNC: 138 MMOL/L (ref 135–146)
T4 FREE SERPL-MCNC: 0.7 NG/DL (ref 0.8–1.8)
TRIGL SERPL-MCNC: 341 MG/DL
TSH SERPL-ACNC: 18.25 MIU/L (ref 0.4–4.5)
WBC # BLD AUTO: 8.4 THOUSAND/UL (ref 3.8–10.8)

## 2020-11-13 DIAGNOSIS — E03.9 HYPOTHYROIDISM, UNSPECIFIED TYPE: Primary | ICD-10-CM

## 2020-11-13 RX ORDER — LEVOTHYROXINE SODIUM 112 UG/1
112 TABLET ORAL DAILY
Qty: 90 TABLET | Refills: 1 | Status: SHIPPED | OUTPATIENT
Start: 2020-11-13 | End: 2021-04-14 | Stop reason: SDUPTHER

## 2020-11-13 NOTE — PROGRESS NOTES
Assessment/Plan:    Consider Neurology evaluation or possibly nighttime amitriptyline or Topamax  Continue thyroid medication as well  Continue fluoxetine  She will call if any worsening symptoms or persisting symptoms  No problem-specific Assessment & Plan notes found for this encounter  Diagnoses and all orders for this visit:    Generalized anxiety disorder  -     FLUoxetine (PROzac) 20 mg capsule; Take 1 capsule (20 mg total) by mouth daily    Hypothyroidism, unspecified type  -     levothyroxine 100 mcg tablet; Take 1 tablet (100 mcg total) by mouth daily    Migraine without status migrainosus, not intractable, unspecified migraine type  -     rizatriptan (MAXALT-MLT) 10 MG disintegrating tablet; Take 1 tablet (10 mg total) by mouth once as needed for migraine for up to 1 dose May repeat in 2 hours if needed    Other orders  -     Discontinue: FLUoxetine (PROzac) 20 mg capsule; Take 1 capsule by mouth daily  -     Discontinue: levothyroxine 100 mcg tablet; Take 1 tablet by mouth daily          Subjective:      Patient ID: Ronnie Etienne is a 47 y o  female  Patient with history of intermittent headaches over the last several months  Symptoms occur at various times throughout the day  She is taking ibuprofen frequently  She had headache this morning and had blood pressure checked at work and  Blood pressure was 150/95 at work  Denies chest pain or shortness of breath  She states ibuprofen as helpful for the headaches at times  Denies diplopia  She does get some optic migraines as well  Daughter and mother both with history of migraines  The following portions of the patient's history were reviewed and updated as appropriate: allergies, current medications, past family history, past medical history, past social history, past surgical history and problem list     Review of Systems   Constitutional: Negative  HENT: Negative  Eyes: Positive for photophobia ( when headaches occur)  Negative for visual disturbance  Respiratory: Negative  Cardiovascular: Negative  Gastrointestinal: Positive for nausea ( nausea with headaches at times  )  Endocrine: Negative  Genitourinary: Negative  Musculoskeletal: Negative  Skin: Negative  Allergic/Immunologic: Negative  Neurological: Positive for headaches  Negative for speech difficulty, light-headedness and numbness  Hematological: Negative  Psychiatric/Behavioral: Negative  Objective:      /88   Temp (!) 97 3 °F (36 3 °C)   Ht 5' (1 524 m)   Wt (!) 232 kg (512 lb)   BMI 99 99 kg/m²          Physical Exam   Constitutional: She is oriented to person, place, and time  She appears well-developed and well-nourished  HENT:   Head: Normocephalic and atraumatic  Right Ear: External ear normal  Tympanic membrane is not erythematous and not bulging  Left Ear: External ear normal  Tympanic membrane is not erythematous and not bulging  Nose: Nose normal    Mouth/Throat: Oropharynx is clear and moist and mucous membranes are normal  No oral lesions  No oropharyngeal exudate  Eyes: Conjunctivae and EOM are normal  Right eye exhibits no discharge  Left eye exhibits no discharge  No scleral icterus  Neck: Normal range of motion  Neck supple  No thyromegaly present  Cardiovascular: Normal rate, regular rhythm and normal heart sounds  Exam reveals no gallop and no friction rub  No murmur heard  Pulmonary/Chest: Effort normal  No respiratory distress  She has no wheezes  She has no rales  She exhibits no tenderness  Abdominal: Soft  Bowel sounds are normal  She exhibits no distension and no mass  There is no tenderness  There is no rebound and no guarding  Musculoskeletal: Normal range of motion  She exhibits no edema, tenderness or deformity  Lymphadenopathy:     She has no cervical adenopathy  Neurological: She is alert and oriented to person, place, and time  She has normal reflexes   No cranial nerve deficit  She exhibits normal muscle tone  Coordination normal    Skin: Skin is warm and dry  No rash noted  No erythema  No pallor  Psychiatric: She has a normal mood and affect  Her behavior is normal    Vitals reviewed  Dressing: bandage

## 2021-01-05 ENCOUNTER — IMMUNIZATIONS (OUTPATIENT)
Dept: FAMILY MEDICINE CLINIC | Facility: HOSPITAL | Age: 56
End: 2021-01-05

## 2021-01-05 DIAGNOSIS — Z23 ENCOUNTER FOR IMMUNIZATION: ICD-10-CM

## 2021-01-05 PROCEDURE — 0011A SARS-COV-2 / COVID-19 MRNA VACCINE (MODERNA) 100 MCG: CPT

## 2021-01-05 PROCEDURE — 91301 SARS-COV-2 / COVID-19 MRNA VACCINE (MODERNA) 100 MCG: CPT

## 2021-01-13 ENCOUNTER — TELEMEDICINE (OUTPATIENT)
Dept: FAMILY MEDICINE CLINIC | Facility: CLINIC | Age: 56
End: 2021-01-13
Payer: COMMERCIAL

## 2021-01-13 DIAGNOSIS — Z11.59 SCREENING FOR VIRAL DISEASE: ICD-10-CM

## 2021-01-13 DIAGNOSIS — Z11.59 SCREENING FOR VIRAL DISEASE: Primary | ICD-10-CM

## 2021-01-13 PROCEDURE — 99214 OFFICE O/P EST MOD 30 MIN: CPT | Performed by: FAMILY MEDICINE

## 2021-01-13 PROCEDURE — U0005 INFEC AGEN DETEC AMPLI PROBE: HCPCS | Performed by: FAMILY MEDICINE

## 2021-01-13 PROCEDURE — U0003 INFECTIOUS AGENT DETECTION BY NUCLEIC ACID (DNA OR RNA); SEVERE ACUTE RESPIRATORY SYNDROME CORONAVIRUS 2 (SARS-COV-2) (CORONAVIRUS DISEASE [COVID-19]), AMPLIFIED PROBE TECHNIQUE, MAKING USE OF HIGH THROUGHPUT TECHNOLOGIES AS DESCRIBED BY CMS-2020-01-R: HCPCS | Performed by: FAMILY MEDICINE

## 2021-01-14 LAB — SARS-COV-2 RNA SPEC QL NAA+PROBE: DETECTED

## 2021-01-15 ENCOUNTER — TELEMEDICINE (OUTPATIENT)
Dept: FAMILY MEDICINE CLINIC | Facility: CLINIC | Age: 56
End: 2021-01-15
Payer: COMMERCIAL

## 2021-01-15 DIAGNOSIS — U07.1 COVID-19: Primary | ICD-10-CM

## 2021-01-15 PROCEDURE — 99213 OFFICE O/P EST LOW 20 MIN: CPT | Performed by: FAMILY MEDICINE

## 2021-01-15 NOTE — PROGRESS NOTES
Virtual Brief Visit    Assessment/Plan:  Patient continues to isolated home until the 21st   She will call with any concerns in the interim  She will call with any new symptoms  Time spent counseling 15 minutes  Problem List Items Addressed This Visit     None      Visit Diagnoses     COVID-19    -  Primary                Reason for visit is No chief complaint on file  Encounter provider Honey Panchal DO    Provider located at [address] [facility] [phone]    Recent Visits  Date Type Provider Dept   01/13/21 Isabel Burr 89, DO [facility]   Showing recent visits within past 7 days and meeting all other requirements     Future Appointments  No visits were found meeting these conditions  Showing future appointments within next 150 days and meeting all other requirements        After connecting through [email or web address] and patient was informed that this is not a secure, HIPAA-compliant platform  She agrees to proceed  , the patient was identified by name and date of birth  Anand Kaur was informed that this is a telemedicine visit and that the visit is being conducted through [email or web address] and patient was informed that this is not a secure, HIPAA-compliant platform  She agrees to proceed     My office door was closed  No one else was in the room  She acknowledged consent and understanding of privacy and security of the platform  The patient has agreed to participate and understands she can discontinue the visit at any time  Patient is aware this is a billable service  Deborah Michel  is a 63 y o  female for covid 23  Patient was diagnosed with COVID earlier in the week and has mild symptoms  She did receive her 1st vaccination about a week and a half ago  Denies any shortness of breath or fevers  No past medical history on file      Past Surgical History:   Procedure Laterality Date    TOTAL ABDOMINAL HYSTERECTOMY W/ BILATERAL SALPINGOOPHORECTOMY      Resolved: 8/2008       Current Outpatient Medications   Medication Sig Dispense Refill    FLUoxetine (PROzac) 20 mg capsule Take 1 capsule (20 mg total) by mouth daily 90 capsule 3    levothyroxine 112 mcg tablet Take 1 tablet (112 mcg total) by mouth daily 90 tablet 1    Multiple Vitamins-Minerals (CENTRUM ADULTS PO) Centrum       No current facility-administered medications for this visit  Allergies   Allergen Reactions    Vancomycin Itching and Other (See Comments)     Facial redness       Review of Systems   Constitutional: Negative  HENT: Positive for congestion  Eyes: Negative  Respiratory: Negative  Cardiovascular: Negative  Gastrointestinal: Negative  Endocrine: Negative  Genitourinary: Negative  Musculoskeletal: Negative  Skin: Negative  Allergic/Immunologic: Negative  Neurological: Negative  Hematological: Negative  Psychiatric/Behavioral: Negative  There were no vitals filed for this visit  I spent 20 minutes directly with the patient during this visit    Jason Collier acknowledges that she has consented to an online visit or consultation  She understands that the online visit is based solely on information provided by her, and that, in the absence of a face-to-face physical evaluation by the physician, the diagnosis she receives is both limited and provisional in terms of accuracy and completeness  This is not intended to replace a full medical face-to-face evaluation by the physician  Shannon Zimmer understands and accepts these terms

## 2021-01-28 ENCOUNTER — TELEPHONE (OUTPATIENT)
Dept: FAMILY MEDICINE CLINIC | Facility: CLINIC | Age: 56
End: 2021-01-28

## 2021-01-28 NOTE — TELEPHONE ENCOUNTER
Pt calling asking if she can get COVID vaccine booster Melissa Tovar) on 2/1/21? Pt had a positive COVID test on 1/13/21  Pt was advised to check with PCP if she can get booster  Please advise

## 2021-04-14 ENCOUNTER — OFFICE VISIT (OUTPATIENT)
Dept: FAMILY MEDICINE CLINIC | Facility: CLINIC | Age: 56
End: 2021-04-14
Payer: COMMERCIAL

## 2021-04-14 ENCOUNTER — HOSPITAL ENCOUNTER (OUTPATIENT)
Dept: RADIOLOGY | Facility: IMAGING CENTER | Age: 56
Discharge: HOME | End: 2021-04-14
Payer: COMMERCIAL

## 2021-04-14 VITALS
WEIGHT: 148.4 LBS | HEIGHT: 61 IN | OXYGEN SATURATION: 97 % | HEART RATE: 106 BPM | SYSTOLIC BLOOD PRESSURE: 124 MMHG | DIASTOLIC BLOOD PRESSURE: 84 MMHG | BODY MASS INDEX: 28.02 KG/M2 | TEMPERATURE: 98 F

## 2021-04-14 DIAGNOSIS — F41.1 GENERALIZED ANXIETY DISORDER: ICD-10-CM

## 2021-04-14 DIAGNOSIS — R29.898 RIGHT ARM WEAKNESS: ICD-10-CM

## 2021-04-14 DIAGNOSIS — R29.898 RIGHT ARM WEAKNESS: Primary | ICD-10-CM

## 2021-04-14 DIAGNOSIS — R29.898 RIGHT LEG WEAKNESS: ICD-10-CM

## 2021-04-14 DIAGNOSIS — E03.9 HYPOTHYROIDISM, UNSPECIFIED TYPE: ICD-10-CM

## 2021-04-14 PROCEDURE — 3008F BODY MASS INDEX DOCD: CPT | Performed by: FAMILY MEDICINE

## 2021-04-14 PROCEDURE — 93000 ELECTROCARDIOGRAM COMPLETE: CPT | Performed by: FAMILY MEDICINE

## 2021-04-14 PROCEDURE — 99214 OFFICE O/P EST MOD 30 MIN: CPT | Performed by: FAMILY MEDICINE

## 2021-04-14 PROCEDURE — G1004 CDSM NDSC: HCPCS

## 2021-04-14 PROCEDURE — 70450 CT HEAD/BRAIN W/O DYE: CPT

## 2021-04-14 RX ORDER — ASPIRIN 81 MG/1
81 TABLET ORAL DAILY
Qty: 90 TABLET | Refills: 1 | Status: SHIPPED | OUTPATIENT
Start: 2021-04-14

## 2021-04-14 NOTE — PROGRESS NOTES
Assessment/Plan:  We discussed the possibility that she may have had a small stroke  Recommended stat CT of the head  Consider MRI of the brain if needed  Recommend MRI of the cervical spine as well  She is a smoker and I recommend that she quit smoking  Recommend echocardiogram as well as carotid Doppler  If CT of the brain is negative recommend starting aspirin 81 mg daily  Recommend recheck in office again in 1-2 weeks  Consider physical therapy if symptoms persist   Consider neurology evaluation if needed  If any new symptoms develop her current symptoms worsen recommend ER evaluation  Time spent counseling and documenting visit and coordinating treatment plan was 30 minutes  Consider physical therapy  1  Right arm weakness  -     CT head wo contrast; Future; Expected date: 04/14/2021  -     MRI cervical spine wo contrast; Future; Expected date: 04/14/2021  -     VAS carotid complete study; Future; Expected date: 04/14/2021  -     Echo complete with contrast if indicated; Future; Expected date: 04/14/2021  -     aspirin (ECOTRIN LOW STRENGTH) 81 mg EC tablet; Take 1 tablet (81 mg total) by mouth daily  -     POCT ECG    2  Right leg weakness  -     CT head wo contrast; Future; Expected date: 04/14/2021  -     MRI cervical spine wo contrast; Future; Expected date: 04/14/2021  -     VAS carotid complete study; Future; Expected date: 04/14/2021  -     Echo complete with contrast if indicated; Future; Expected date: 04/14/2021  -     aspirin (ECOTRIN LOW STRENGTH) 81 mg EC tablet; Take 1 tablet (81 mg total) by mouth daily  -     POCT ECG          Subjective:      Patient ID: Ileana Low is a 63 y o  female  Patient presents today with complaint of weakness and numbness to the 3rd 4th and finger of right hand and some numbness up the arm  She also notes some strange numb feeling to the right foot as well  Onset of symptoms was 5 days ago  She awoke with the symptoms    Denies any headaches or diplopia  Symptoms persist   She states that it feels strange to walk  She has it slightly difficult time gripping things with the outer aspect of the right hand  The following portions of the patient's history were reviewed and updated as appropriate: allergies, current medications, past family history, past medical history, past social history, past surgical history, and problem list     Review of Systems      Objective:      /84 (BP Location: Left arm, Patient Position: Sitting, Cuff Size: Adult)   Pulse (!) 106   Temp 98 °F (36 7 °C) (Temporal)   Ht 5' 0 5" (1 537 m)   Wt 67 3 kg (148 lb 6 4 oz)   LMP  (LMP Unknown)   SpO2 97%   BMI 28 51 kg/m²          Physical Exam  Vitals signs reviewed  Constitutional:       Appearance: She is well-developed  HENT:      Head: Normocephalic and atraumatic  Comments: No carotid bruit  Right Ear: External ear normal  Tympanic membrane is not erythematous or bulging  Left Ear: External ear normal  Tympanic membrane is not erythematous or bulging  Nose: Nose normal       Mouth/Throat:      Mouth: No oral lesions  Pharynx: No oropharyngeal exudate  Eyes:      General: No scleral icterus  Right eye: No discharge  Left eye: No discharge  Conjunctiva/sclera: Conjunctivae normal    Neck:      Musculoskeletal: Normal range of motion and neck supple  Thyroid: No thyromegaly  Cardiovascular:      Rate and Rhythm: Normal rate and regular rhythm  Heart sounds: Normal heart sounds  No murmur  No friction rub  No gallop  Pulmonary:      Effort: Pulmonary effort is normal  No respiratory distress  Breath sounds: No wheezing or rales  Chest:      Chest wall: No tenderness  Abdominal:      General: Bowel sounds are normal  There is no distension  Palpations: Abdomen is soft  There is no mass  Tenderness: There is no abdominal tenderness  There is no guarding or rebound  Musculoskeletal: Normal range of motion  General: No tenderness or deformity  Lymphadenopathy:      Cervical: No cervical adenopathy  Skin:     General: Skin is warm and dry  Coloration: Skin is not pale  Findings: No erythema or rash  Neurological:      Mental Status: She is alert and oriented to person, place, and time  Cranial Nerves: No cranial nerve deficit  Motor: No abnormal muscle tone  Coordination: Coordination normal       Deep Tendon Reflexes: Reflexes are normal and symmetric  Comments: Slightly diminished strength to the right hand to the ulnar aspect with the 3rd 4th and 5th fingers     Psychiatric:         Behavior: Behavior normal

## 2021-04-15 ENCOUNTER — TELEPHONE (OUTPATIENT)
Dept: FAMILY MEDICINE CLINIC | Facility: CLINIC | Age: 56
End: 2021-04-15

## 2021-04-15 RX ORDER — FLUOXETINE HYDROCHLORIDE 20 MG/1
20 CAPSULE ORAL DAILY
Qty: 90 CAPSULE | Refills: 0 | Status: SHIPPED | OUTPATIENT
Start: 2021-04-15 | End: 2021-06-17 | Stop reason: SDUPTHER

## 2021-04-15 RX ORDER — LEVOTHYROXINE SODIUM 112 UG/1
112 TABLET ORAL DAILY
Qty: 90 TABLET | Refills: 0 | Status: SHIPPED | OUTPATIENT
Start: 2021-04-15 | End: 2021-06-24

## 2021-04-16 ENCOUNTER — HOSPITAL ENCOUNTER (OUTPATIENT)
Dept: NON INVASIVE DIAGNOSTICS | Facility: CLINIC | Age: 56
Discharge: HOME | End: 2021-04-16
Payer: COMMERCIAL

## 2021-04-16 DIAGNOSIS — R29.898 RIGHT ARM WEAKNESS: ICD-10-CM

## 2021-04-16 DIAGNOSIS — R29.898 RIGHT LEG WEAKNESS: ICD-10-CM

## 2021-04-16 PROCEDURE — 93306 TTE W/DOPPLER COMPLETE: CPT

## 2021-04-16 PROCEDURE — 93306 TTE W/DOPPLER COMPLETE: CPT | Performed by: INTERNAL MEDICINE

## 2021-04-19 ENCOUNTER — HOSPITAL ENCOUNTER (OUTPATIENT)
Dept: NON INVASIVE DIAGNOSTICS | Facility: HOSPITAL | Age: 56
Discharge: HOME | End: 2021-04-19
Payer: COMMERCIAL

## 2021-04-19 DIAGNOSIS — R29.898 RIGHT LEG WEAKNESS: ICD-10-CM

## 2021-04-19 DIAGNOSIS — R29.898 RIGHT ARM WEAKNESS: ICD-10-CM

## 2021-04-19 PROCEDURE — 93880 EXTRACRANIAL BILAT STUDY: CPT

## 2021-04-19 PROCEDURE — 93880 EXTRACRANIAL BILAT STUDY: CPT | Performed by: SURGERY

## 2021-05-03 ENCOUNTER — HOSPITAL ENCOUNTER (OUTPATIENT)
Dept: RADIOLOGY | Facility: IMAGING CENTER | Age: 56
Discharge: HOME | End: 2021-05-03
Payer: COMMERCIAL

## 2021-05-03 DIAGNOSIS — R29.898 RIGHT ARM WEAKNESS: ICD-10-CM

## 2021-05-03 DIAGNOSIS — R29.898 RIGHT LEG WEAKNESS: ICD-10-CM

## 2021-05-03 PROCEDURE — G1004 CDSM NDSC: HCPCS

## 2021-05-03 PROCEDURE — 72141 MRI NECK SPINE W/O DYE: CPT

## 2021-05-05 ENCOUNTER — VBI (OUTPATIENT)
Dept: ADMINISTRATIVE | Facility: OTHER | Age: 56
End: 2021-05-05

## 2021-05-05 ENCOUNTER — OFFICE VISIT (OUTPATIENT)
Dept: FAMILY MEDICINE CLINIC | Facility: CLINIC | Age: 56
End: 2021-05-05
Payer: COMMERCIAL

## 2021-05-05 VITALS
HEART RATE: 89 BPM | OXYGEN SATURATION: 97 % | HEIGHT: 60 IN | WEIGHT: 145 LBS | TEMPERATURE: 97.7 F | BODY MASS INDEX: 28.47 KG/M2 | DIASTOLIC BLOOD PRESSURE: 70 MMHG | SYSTOLIC BLOOD PRESSURE: 100 MMHG

## 2021-05-05 DIAGNOSIS — J01.00 ACUTE NON-RECURRENT MAXILLARY SINUSITIS: Primary | ICD-10-CM

## 2021-05-05 DIAGNOSIS — E03.9 HYPOTHYROIDISM, UNSPECIFIED TYPE: ICD-10-CM

## 2021-05-05 LAB — TSH SERPL DL<=0.05 MIU/L-ACNC: 1.84 UIU/ML (ref 0.36–3.74)

## 2021-05-05 PROCEDURE — 84443 ASSAY THYROID STIM HORMONE: CPT | Performed by: FAMILY MEDICINE

## 2021-05-05 PROCEDURE — 3008F BODY MASS INDEX DOCD: CPT | Performed by: FAMILY MEDICINE

## 2021-05-05 PROCEDURE — 99214 OFFICE O/P EST MOD 30 MIN: CPT | Performed by: FAMILY MEDICINE

## 2021-05-05 PROCEDURE — 36415 COLL VENOUS BLD VENIPUNCTURE: CPT | Performed by: FAMILY MEDICINE

## 2021-05-05 RX ORDER — ONDANSETRON 4 MG/1
4 TABLET, FILM COATED ORAL EVERY 8 HOURS PRN
Qty: 20 TABLET | Refills: 0 | Status: SHIPPED | OUTPATIENT
Start: 2021-05-05

## 2021-05-05 RX ORDER — LEVOFLOXACIN 500 MG/1
500 TABLET, FILM COATED ORAL EVERY 24 HOURS
Qty: 5 TABLET | Refills: 0 | Status: SHIPPED | OUTPATIENT
Start: 2021-05-05 | End: 2021-05-10

## 2021-05-05 NOTE — LETTER
May 5, 2021     Patient: Annie Sadler   YOB: 1965   Date of Visit: 5/5/2021       To Whom it May Concern:    Annie Sadler is under my professional care  She was seen in my office on 5/5/2021  She may return to work on 05/10/2021  If you have any questions or concerns, please don't hesitate to call           Sincerely,          Muna Pedroza, DO        CC: No Recipients

## 2021-05-05 NOTE — TELEPHONE ENCOUNTER
05/05/21 8:36 AM     See documentation in the [facility] SmartForm       [employer] Card

## 2021-05-10 NOTE — PROGRESS NOTES
Assessment/Plan:     1  Acute non-recurrent maxillary sinusitis  -     levofloxacin (LEVAQUIN) 500 mg tablet; Take 1 tablet (500 mg total) by mouth every 24 hours for 5 days  -     ondansetron (ZOFRAN) 4 mg tablet; Take 1 tablet (4 mg total) by mouth every 8 (eight) hours as needed for nausea or vomiting    2  Hypothyroidism, unspecified type  -     TSH, 3rd generation with Free T4 reflex          Subjective:      Patient ID: Nomi Carmichael is a 63 y o  female  Patient here for chronic congestion and sinus pressure  She also is here for recheck on hypothyroidism  BMI Counseling: Body mass index is 28 32 kg/m²  The BMI is above normal  Nutrition recommendations include decreasing portion sizes  Exercise recommendations include moderate physical activity 150 minutes/week  Depression Screening and Follow-up Plan: Clincally patient does not have depression  No treatment is required  The following portions of the patient's history were reviewed and updated as appropriate: allergies, current medications, past family history, past medical history, past social history, past surgical history, and problem list     Review of Systems   Constitutional: Negative  HENT: Positive for congestion and sinus pressure  Eyes: Negative  Respiratory: Negative  Cardiovascular: Negative  Gastrointestinal: Negative  Endocrine: Negative  Genitourinary: Negative  Musculoskeletal: Negative  Skin: Negative  Allergic/Immunologic: Negative  Neurological: Negative  Hematological: Negative  Psychiatric/Behavioral: Negative  Objective:      /70 (BP Location: Left arm, Patient Position: Sitting, Cuff Size: Standard)   Pulse 89   Temp 97 7 °F (36 5 °C) (Temporal)   Ht 5' (1 524 m)   Wt 65 8 kg (145 lb)   LMP  (LMP Unknown)   SpO2 97%   BMI 28 32 kg/m²          Physical Exam  Vitals signs reviewed  Constitutional:       Appearance: She is well-developed     HENT: Head: Normocephalic and atraumatic  Right Ear: External ear normal  Tympanic membrane is not erythematous or bulging  Left Ear: External ear normal  Tympanic membrane is not erythematous or bulging  Nose: Nose normal       Mouth/Throat:      Mouth: No oral lesions  Pharynx: No oropharyngeal exudate  Eyes:      General: No scleral icterus  Right eye: No discharge  Left eye: No discharge  Conjunctiva/sclera: Conjunctivae normal    Neck:      Musculoskeletal: Normal range of motion and neck supple  Thyroid: No thyromegaly  Cardiovascular:      Rate and Rhythm: Normal rate and regular rhythm  Heart sounds: Normal heart sounds  No murmur  No friction rub  No gallop  Pulmonary:      Effort: Pulmonary effort is normal  No respiratory distress  Breath sounds: No wheezing or rales  Chest:      Chest wall: No tenderness  Abdominal:      General: Bowel sounds are normal  There is no distension  Palpations: Abdomen is soft  There is no mass  Tenderness: There is no abdominal tenderness  There is no guarding or rebound  Musculoskeletal: Normal range of motion  General: No tenderness or deformity  Lymphadenopathy:      Cervical: No cervical adenopathy  Skin:     General: Skin is warm and dry  Coloration: Skin is not pale  Findings: No erythema or rash  Neurological:      Mental Status: She is alert and oriented to person, place, and time  Cranial Nerves: No cranial nerve deficit  Motor: No abnormal muscle tone  Coordination: Coordination normal       Deep Tendon Reflexes: Reflexes are normal and symmetric     Psychiatric:         Behavior: Behavior normal

## 2021-06-03 ENCOUNTER — VBI (OUTPATIENT)
Dept: ADMINISTRATIVE | Facility: OTHER | Age: 56
End: 2021-06-03

## 2021-06-17 DIAGNOSIS — E03.9 HYPOTHYROIDISM, UNSPECIFIED TYPE: ICD-10-CM

## 2021-06-17 DIAGNOSIS — F41.1 GENERALIZED ANXIETY DISORDER: ICD-10-CM

## 2021-06-17 NOTE — TELEPHONE ENCOUNTER
Pt also called for a refill of her levothyroxine  I verified the dose of 112mcg with her, but she states that she has been taking 100mcg  Since she has been on this, can you send a refill for the 100mcg? Her TSH in May came back normal, and she was on 100mcg  Please advise  Thank you

## 2021-06-18 RX ORDER — FLUOXETINE HYDROCHLORIDE 20 MG/1
20 CAPSULE ORAL DAILY
Qty: 90 CAPSULE | Refills: 3 | Status: SHIPPED | OUTPATIENT
Start: 2021-06-18 | End: 2021-06-24 | Stop reason: SDUPTHER

## 2021-06-24 DIAGNOSIS — E03.9 HYPOTHYROIDISM, UNSPECIFIED TYPE: Primary | ICD-10-CM

## 2021-06-24 RX ORDER — FLUOXETINE HYDROCHLORIDE 20 MG/1
20 CAPSULE ORAL DAILY
Qty: 90 CAPSULE | Refills: 3 | Status: SHIPPED | OUTPATIENT
Start: 2021-06-24

## 2021-06-24 RX ORDER — LEVOTHYROXINE SODIUM 112 UG/1
112 TABLET ORAL
Qty: 90 TABLET | Refills: 3 | Status: SHIPPED | OUTPATIENT
Start: 2021-06-24

## 2021-06-24 NOTE — TELEPHONE ENCOUNTER
Please review message, pt levothyroxine was never filled  Pt is losing her insurance at the end of the month and needs this medication      Thank you

## 2021-06-24 NOTE — TELEPHONE ENCOUNTER
Her TSH was normal but her T4 was low which prompted the increase in the medication dose so I will send in 112 mcg

## 2021-08-06 ENCOUNTER — TELEPHONE (OUTPATIENT)
Dept: FAMILY MEDICINE CLINIC | Facility: CLINIC | Age: 56
End: 2021-08-06

## 2021-08-06 NOTE — TELEPHONE ENCOUNTER
Lmom advised pt to call back in regards to mammogram records   Pt is past due, was checking to see if pt has an appt scheduled or if she  Had mammo done else where, pt has an order from nov 2020 still

## 2022-05-31 ENCOUNTER — OFFICE VISIT (OUTPATIENT)
Dept: FAMILY MEDICINE CLINIC | Facility: CLINIC | Age: 57
End: 2022-05-31
Payer: COMMERCIAL

## 2022-05-31 VITALS
DIASTOLIC BLOOD PRESSURE: 78 MMHG | TEMPERATURE: 97.7 F | WEIGHT: 142 LBS | BODY MASS INDEX: 27.88 KG/M2 | SYSTOLIC BLOOD PRESSURE: 132 MMHG | OXYGEN SATURATION: 98 % | HEART RATE: 90 BPM | HEIGHT: 60 IN

## 2022-05-31 DIAGNOSIS — Z11.4 SCREENING FOR HIV (HUMAN IMMUNODEFICIENCY VIRUS): ICD-10-CM

## 2022-05-31 DIAGNOSIS — Z12.12 SCREENING FOR COLORECTAL CANCER: ICD-10-CM

## 2022-05-31 DIAGNOSIS — Z12.11 SCREENING FOR COLORECTAL CANCER: ICD-10-CM

## 2022-05-31 DIAGNOSIS — Z12.31 ENCOUNTER FOR SCREENING MAMMOGRAM FOR MALIGNANT NEOPLASM OF BREAST: ICD-10-CM

## 2022-05-31 DIAGNOSIS — M54.9 BACK PAIN, UNSPECIFIED BACK LOCATION, UNSPECIFIED BACK PAIN LATERALITY, UNSPECIFIED CHRONICITY: ICD-10-CM

## 2022-05-31 DIAGNOSIS — E03.9 HYPOTHYROIDISM, UNSPECIFIED TYPE: ICD-10-CM

## 2022-05-31 DIAGNOSIS — Z11.59 NEED FOR HEPATITIS C SCREENING TEST: ICD-10-CM

## 2022-05-31 DIAGNOSIS — Z00.00 WELL ADULT EXAM: Primary | ICD-10-CM

## 2022-05-31 PROCEDURE — 99396 PREV VISIT EST AGE 40-64: CPT | Performed by: FAMILY MEDICINE

## 2022-05-31 NOTE — LETTER
May 31, 2022     Patient: Vane Alonso  YOB: 1965  Date of Visit: 5/31/2022      To Whom it May Concern:    Vane Alonso is under my professional care  Shannon was seen in my office on 5/31/2022  Shannon has a history of back pain issues and we are recommending that she be on a work restriction of no lifting, pushing, or pulling greater than 35 pounds  Thank you for your consideration in this matter  If you have any questions or concerns, please don't hesitate to call           Sincerely,          Gerber Duque DO        CC: No Recipients

## 2022-06-01 NOTE — PROGRESS NOTES
Assessment/Plan:  Consider follow-up with physical therapy for back pain if symptoms persist or worsen  No provided for work  Consider imaging if symptoms persist   Recommend recheck TSH again in 6 months  Time spent counseling coordinating care and reviewing treatment options was 30 minutes  Recommend annual mammography and follow-up with gynecology  1  Well adult exam    2  Back pain, unspecified back location, unspecified back pain laterality, unspecified chronicity    3  Encounter for screening mammogram for malignant neoplasm of breast    4  Screening for colorectal cancer  -     Ambulatory referral for colonoscopy; Future  -     Cologuard    5  Need for hepatitis C screening test  -     Hepatitis C Antibody ([facility]); Future    6  Screening for HIV (human immunodeficiency virus)  -     HIV 1/2 Antigen/Antibody (4th Generation) w Reflex [facility]; Future    7  Hypothyroidism, unspecified type          Subjective:      Patient ID: Lennie Castro is a 61 y o  female  Patient with chronic medical issues in for annual checkup and concern including back pain and hypothyroidism is here today for need for note for work  She states that she cannot lift or push or pull greater than 35 pounds  She needs a work note explaining this  She denies any other concerns today  The following portions of the patient's history were reviewed and updated as appropriate: allergies, current medications, past family history, past medical history, past social history, past surgical history, and problem list     Review of Systems   Constitutional: Negative  HENT: Negative  Eyes: Negative  Respiratory: Negative  Cardiovascular: Negative  Gastrointestinal: Negative  Endocrine: Negative  Genitourinary: Negative  Musculoskeletal: Positive for back pain  Skin: Negative  Allergic/Immunologic: Negative  Neurological: Negative  Hematological: Negative      Psychiatric/Behavioral: Negative  Objective:      /78 (BP Location: Left arm, Patient Position: Sitting, Cuff Size: Standard)   Pulse 90   Temp 97 7 °F (36 5 °C) (Temporal)   Ht 5' (1 524 m)   Wt 64 4 kg (142 lb)   LMP  (LMP Unknown)   SpO2 98%   BMI 27 73 kg/m²          Physical Exam  Vitals reviewed  Constitutional:       Appearance: She is well-developed  HENT:      Head: Normocephalic and atraumatic  Right Ear: External ear normal  Tympanic membrane is not erythematous or bulging  Left Ear: External ear normal  Tympanic membrane is not erythematous or bulging  Nose: Nose normal       Mouth/Throat:      Mouth: No oral lesions  Pharynx: No oropharyngeal exudate  Eyes:      General: No scleral icterus  Right eye: No discharge  Left eye: No discharge  Conjunctiva/sclera: Conjunctivae normal    Neck:      Thyroid: No thyromegaly  Cardiovascular:      Rate and Rhythm: Normal rate and regular rhythm  Heart sounds: Normal heart sounds  No murmur heard  No friction rub  No gallop  Pulmonary:      Effort: Pulmonary effort is normal  No respiratory distress  Breath sounds: No wheezing or rales  Chest:      Chest wall: No tenderness  Abdominal:      General: Bowel sounds are normal  There is no distension  Palpations: Abdomen is soft  There is no mass  Tenderness: There is no abdominal tenderness  There is no guarding or rebound  Musculoskeletal:         General: No tenderness or deformity  Normal range of motion  Cervical back: Normal range of motion and neck supple  Lymphadenopathy:      Cervical: No cervical adenopathy  Skin:     General: Skin is warm and dry  Coloration: Skin is not pale  Findings: No erythema or rash  Neurological:      Mental Status: She is alert and oriented to person, place, and time  Cranial Nerves: No cranial nerve deficit  Motor: No abnormal muscle tone        Coordination: Coordination normal       Deep Tendon Reflexes: Reflexes are normal and symmetric     Psychiatric:         Behavior: Behavior normal

## 2022-09-13 ENCOUNTER — CLINICAL SUPPORT (OUTPATIENT)
Dept: FAMILY MEDICINE CLINIC | Facility: CLINIC | Age: 57
End: 2022-09-13
Payer: COMMERCIAL

## 2022-09-13 DIAGNOSIS — Z23 NEED FOR TDAP VACCINATION: Primary | ICD-10-CM

## 2022-09-13 PROCEDURE — 90471 IMMUNIZATION ADMIN: CPT

## 2022-09-13 PROCEDURE — 90715 TDAP VACCINE 7 YRS/> IM: CPT

## 2022-11-01 ENCOUNTER — OFFICE VISIT (OUTPATIENT)
Dept: FAMILY MEDICINE CLINIC | Facility: CLINIC | Age: 57
End: 2022-11-01

## 2022-11-01 VITALS — TEMPERATURE: 97.6 F | HEIGHT: 60 IN | BODY MASS INDEX: 25.6 KG/M2 | WEIGHT: 130.4 LBS

## 2022-11-01 DIAGNOSIS — Z11.59 SCREENING FOR VIRAL DISEASE: ICD-10-CM

## 2022-11-01 DIAGNOSIS — J01.00 ACUTE NON-RECURRENT MAXILLARY SINUSITIS: Primary | ICD-10-CM

## 2022-11-01 RX ORDER — AMOXICILLIN AND CLAVULANATE POTASSIUM 875; 125 MG/1; MG/1
1 TABLET, FILM COATED ORAL EVERY 12 HOURS SCHEDULED
Qty: 14 TABLET | Refills: 0 | Status: SHIPPED | OUTPATIENT
Start: 2022-11-01 | End: 2022-11-08

## 2022-11-02 LAB — SARS-COV-2 RNA RESP QL NAA+PROBE: NEGATIVE

## 2022-11-02 NOTE — PROGRESS NOTES
Assessment/Plan:     1  Acute non-recurrent maxillary sinusitis  -     amoxicillin-clavulanate (AUGMENTIN) 875-125 mg per tablet; Take 1 tablet by mouth every 12 (twelve) hours for 7 days    2  Screening for viral disease  -     COVID Only - Office Collect          Subjective:      Patient ID: Santana Spangler is a 61 y o  female  Patient is seen today for congestion  Patient denies any cough  COVID testing was negative  The following portions of the patient's history were reviewed and updated as appropriate: allergies, current medications, past family history, past medical history, past social history, past surgical history, and problem list     Review of Systems   Constitutional: Negative  Negative for fever  HENT: Positive for congestion  Eyes: Negative  Respiratory: Negative  Negative for cough  Cardiovascular: Negative  Gastrointestinal: Negative  Endocrine: Negative  Genitourinary: Negative  Musculoskeletal: Negative  Skin: Negative  Allergic/Immunologic: Negative  Neurological: Negative  Hematological: Negative  Psychiatric/Behavioral: Negative  Objective:      Temp 97 6 °F (36 4 °C) (Temporal)   Ht 5' (1 524 m)   Wt 59 1 kg (130 lb 6 4 oz)   LMP  (LMP Unknown)   BMI 25 47 kg/m²          Physical Exam  Vitals reviewed  Constitutional:       Appearance: She is well-developed  HENT:      Head: Normocephalic and atraumatic  Right Ear: External ear normal  Tympanic membrane is not erythematous or bulging  Left Ear: External ear normal  Tympanic membrane is not erythematous or bulging  Nose: Nose normal       Mouth/Throat:      Mouth: No oral lesions  Pharynx: No oropharyngeal exudate  Eyes:      General: No scleral icterus  Right eye: No discharge  Left eye: No discharge  Conjunctiva/sclera: Conjunctivae normal    Neck:      Thyroid: No thyromegaly     Cardiovascular:      Rate and Rhythm: Normal rate and regular rhythm  Heart sounds: Normal heart sounds  No murmur heard  No friction rub  No gallop  Pulmonary:      Effort: Pulmonary effort is normal  No respiratory distress  Breath sounds: No wheezing or rales  Chest:      Chest wall: No tenderness  Abdominal:      General: Bowel sounds are normal  There is no distension  Palpations: Abdomen is soft  There is no mass  Tenderness: There is no abdominal tenderness  There is no guarding or rebound  Musculoskeletal:         General: No tenderness or deformity  Normal range of motion  Cervical back: Normal range of motion and neck supple  Lymphadenopathy:      Cervical: No cervical adenopathy  Skin:     General: Skin is warm and dry  Coloration: Skin is not pale  Findings: No erythema or rash  Neurological:      Mental Status: She is alert and oriented to person, place, and time  Cranial Nerves: No cranial nerve deficit  Motor: No abnormal muscle tone  Coordination: Coordination normal       Deep Tendon Reflexes: Reflexes are normal and symmetric     Psychiatric:         Behavior: Behavior normal

## 2022-11-29 ENCOUNTER — APPOINTMENT (OUTPATIENT)
Dept: LAB | Age: 57
End: 2022-11-29

## 2022-11-29 ENCOUNTER — OFFICE VISIT (OUTPATIENT)
Dept: FAMILY MEDICINE CLINIC | Facility: CLINIC | Age: 57
End: 2022-11-29

## 2022-11-29 VITALS
HEART RATE: 81 BPM | WEIGHT: 133.4 LBS | BODY MASS INDEX: 26.19 KG/M2 | TEMPERATURE: 97.2 F | DIASTOLIC BLOOD PRESSURE: 92 MMHG | HEIGHT: 60 IN | SYSTOLIC BLOOD PRESSURE: 144 MMHG | OXYGEN SATURATION: 99 %

## 2022-11-29 DIAGNOSIS — I63.81 LACUNAR INFARCTION (HCC): ICD-10-CM

## 2022-11-29 DIAGNOSIS — R55 NEAR SYNCOPE: Primary | ICD-10-CM

## 2022-11-29 DIAGNOSIS — R55 NEAR SYNCOPE: ICD-10-CM

## 2022-11-29 LAB
ALBUMIN SERPL BCP-MCNC: 4.1 G/DL (ref 3.5–5)
ALP SERPL-CCNC: 65 U/L (ref 46–116)
ALT SERPL W P-5'-P-CCNC: 22 U/L (ref 12–78)
ANION GAP SERPL CALCULATED.3IONS-SCNC: 6 MMOL/L (ref 4–13)
ANISOCYTOSIS BLD QL SMEAR: PRESENT
AST SERPL W P-5'-P-CCNC: 16 U/L (ref 5–45)
BASOPHILS # BLD MANUAL: 0 THOUSAND/UL (ref 0–0.1)
BASOPHILS NFR MAR MANUAL: 0 % (ref 0–1)
BILIRUB SERPL-MCNC: 0.29 MG/DL (ref 0.2–1)
BUN SERPL-MCNC: 13 MG/DL (ref 5–25)
CALCIUM SERPL-MCNC: 10 MG/DL (ref 8.3–10.1)
CHLORIDE SERPL-SCNC: 104 MMOL/L (ref 96–108)
CO2 SERPL-SCNC: 26 MMOL/L (ref 21–32)
CREAT SERPL-MCNC: 0.99 MG/DL (ref 0.6–1.3)
DIFFERENTIAL COMMENT: ABNORMAL
EOSINOPHIL # BLD MANUAL: 0.19 THOUSAND/UL (ref 0–0.4)
EOSINOPHIL NFR BLD MANUAL: 2 % (ref 0–6)
ERYTHROCYTE [DISTWIDTH] IN BLOOD BY AUTOMATED COUNT: 15.5 % (ref 11.6–15.1)
GFR SERPL CREATININE-BSD FRML MDRD: 63 ML/MIN/1.73SQ M
GLUCOSE SERPL-MCNC: 99 MG/DL (ref 65–140)
HCT VFR BLD AUTO: 42.9 % (ref 34.8–46.1)
HGB BLD-MCNC: 13.6 G/DL (ref 11.5–15.4)
LYMPHOCYTES # BLD AUTO: 5.51 THOUSAND/UL (ref 0.6–4.47)
LYMPHOCYTES # BLD AUTO: 59 % (ref 14–44)
MCH RBC QN AUTO: 29.3 PG (ref 26.8–34.3)
MCHC RBC AUTO-ENTMCNC: 31.7 G/DL (ref 31.4–37.4)
MCV RBC AUTO: 93 FL (ref 82–98)
MONOCYTES # BLD AUTO: 0.56 THOUSAND/UL (ref 0–1.22)
MONOCYTES NFR BLD: 6 % (ref 4–12)
NEUTROPHILS # BLD MANUAL: 2.8 THOUSAND/UL (ref 1.85–7.62)
NEUTS BAND NFR BLD MANUAL: 2 % (ref 0–8)
NEUTS SEG NFR BLD AUTO: 28 % (ref 43–75)
OVALOCYTES BLD QL SMEAR: PRESENT
PLATELET # BLD AUTO: 277 THOUSANDS/UL (ref 149–390)
PLATELET BLD QL SMEAR: ABNORMAL
PMV BLD AUTO: 9.8 FL (ref 8.9–12.7)
POIKILOCYTOSIS BLD QL SMEAR: PRESENT
POTASSIUM SERPL-SCNC: 4.6 MMOL/L (ref 3.5–5.3)
PROT SERPL-MCNC: 8.2 G/DL (ref 6.4–8.4)
RBC # BLD AUTO: 4.64 MILLION/UL (ref 3.81–5.12)
RBC MORPH BLD: NORMAL
SMUDGE CELLS BLD QL SMEAR: PRESENT
SODIUM SERPL-SCNC: 136 MMOL/L (ref 135–147)
T4 FREE SERPL-MCNC: 0.45 NG/DL (ref 0.76–1.46)
TSH SERPL DL<=0.05 MIU/L-ACNC: 35.6 UIU/ML (ref 0.45–4.5)
VARIANT LYMPHS # BLD AUTO: 3 %
WBC # BLD AUTO: 9.34 THOUSAND/UL (ref 4.31–10.16)

## 2022-11-29 NOTE — PROGRESS NOTES
Assessment/Plan:  Recommend Holter monitor  Considering recent event and history of lacunar infarct in the past recommended MRI  Consider neurology evaluation or Cardiology evaluation if symptoms persist or recur  Await Holter monitor  We will repeat CBC as well in addition other lab testing  Recommend avoiding smoking  She will call with any new persisting or worsening symptoms  1  Near syncope  -     Comprehensive metabolic panel  -     TSH, 3rd generation with Free T4 reflex; Future  -     MRI brain wo contrast; Future; Expected date: 11/29/2022  -     CBC and differential  -     Holter monitor; Future; Expected date: 11/29/2022    2  Lacunar infarction ([facility] [address] )  -     Comprehensive metabolic panel  -     TSH, 3rd generation with Free T4 reflex; Future  -     MRI brain wo contrast; Future; Expected date: 11/29/2022  -     CBC and differential  -     Holter monitor; Future; Expected date: 11/29/2022          Subjective:      Patient ID: Yuki Escobar is a 61 y o  female  Patient with likely history of past lacunar infarct is here today for follow-up after recent ER evaluation for near syncopal event  She was at work and felt lightheaded and went to the ground but did not lose consciousness  She was subsequently seen in emergency room  She did have an abnormal CBC but CT imaging was unremarkable for acute recent event  She is feeling better today  The following portions of the patient's history were reviewed and updated as appropriate: allergies, current medications, past family history, past medical history, past social history, past surgical history, and problem list     Review of Systems   Constitutional: Negative  HENT: Negative  Eyes: Negative  Respiratory: Negative  Cardiovascular: Negative  Gastrointestinal: Negative  Endocrine: Negative  Genitourinary: Negative  Musculoskeletal: Negative  Skin: Negative  Allergic/Immunologic: Negative      Neurological: As noted in HPI   Hematological: Negative  Psychiatric/Behavioral: Negative  Objective:      /92 (BP Location: Left arm, Patient Position: Sitting, Cuff Size: Standard)   Pulse 81   Temp (!) 97 2 °F (36 2 °C) (Tympanic)   Ht 5' (1 524 m)   Wt 60 5 kg (133 lb 6 4 oz)   LMP  (LMP Unknown)   SpO2 99%   BMI 26 05 kg/m²          Physical Exam  Vitals reviewed  Constitutional:       Appearance: She is well-developed and well-nourished  HENT:      Head: Normocephalic and atraumatic  Right Ear: External ear normal  Tympanic membrane is not erythematous or bulging  Left Ear: External ear normal  Tympanic membrane is not erythematous or bulging  Nose: Nose normal       Mouth/Throat:      Mouth: Oropharynx is clear and moist and mucous membranes are normal  No oral lesions  Pharynx: No oropharyngeal exudate  Eyes:      General: No scleral icterus  Right eye: No discharge  Left eye: No discharge  Extraocular Movements: EOM normal       Conjunctiva/sclera: Conjunctivae normal    Neck:      Thyroid: No thyromegaly  Cardiovascular:      Rate and Rhythm: Normal rate and regular rhythm  Heart sounds: Normal heart sounds  No murmur heard  No friction rub  No gallop  Pulmonary:      Effort: Pulmonary effort is normal  No respiratory distress  Breath sounds: No wheezing or rales  Chest:      Chest wall: No tenderness  Abdominal:      General: Bowel sounds are normal  There is no distension  Palpations: Abdomen is soft  There is no mass  Tenderness: There is no abdominal tenderness  There is no guarding or rebound  Musculoskeletal:         General: No tenderness, deformity or edema  Normal range of motion  Cervical back: Normal range of motion and neck supple  Lymphadenopathy:      Cervical: No cervical adenopathy  Skin:     General: Skin is warm and dry  Coloration: Skin is not pale        Findings: No erythema or rash    Neurological:      Mental Status: She is alert and oriented to person, place, and time  Cranial Nerves: No cranial nerve deficit  Motor: No abnormal muscle tone  Coordination: Coordination normal       Deep Tendon Reflexes: Reflexes are normal and symmetric     Psychiatric:         Mood and Affect: Mood and affect normal          Behavior: Behavior normal

## 2022-12-02 DIAGNOSIS — D72.820 LYMPHOCYTOSIS: Primary | ICD-10-CM

## 2022-12-02 DIAGNOSIS — E03.9 HYPOTHYROIDISM, UNSPECIFIED TYPE: ICD-10-CM

## 2022-12-02 RX ORDER — LEVOTHYROXINE SODIUM 0.15 MG/1
150 TABLET ORAL
Qty: 90 TABLET | Refills: 1 | Status: SHIPPED | OUTPATIENT
Start: 2022-12-02 | End: 2022-12-28

## 2022-12-02 RX ORDER — LEVOTHYROXINE SODIUM 0.12 MG/1
125 TABLET ORAL
Qty: 90 TABLET | Refills: 0 | Status: SHIPPED | OUTPATIENT
Start: 2022-12-02 | End: 2022-12-02 | Stop reason: SDUPTHER

## 2022-12-05 ENCOUNTER — TELEPHONE (OUTPATIENT)
Dept: FAMILY MEDICINE CLINIC | Facility: CLINIC | Age: 57
End: 2022-12-05

## 2022-12-05 NOTE — TELEPHONE ENCOUNTER
Patient called she is asking for a letter for restrictive duty at work      Light duty  Frequently breaks      Please call patient when ready to  letter

## 2022-12-07 ENCOUNTER — HOSPITAL ENCOUNTER (OUTPATIENT)
Dept: RADIOLOGY | Facility: IMAGING CENTER | Age: 57
Discharge: HOME | End: 2022-12-07

## 2022-12-07 DIAGNOSIS — R55 NEAR SYNCOPE: ICD-10-CM

## 2022-12-07 DIAGNOSIS — I63.81 LACUNAR INFARCTION (HCC): ICD-10-CM

## 2022-12-08 ENCOUNTER — OFFICE VISIT (OUTPATIENT)
Dept: FAMILY MEDICINE CLINIC | Facility: CLINIC | Age: 57
End: 2022-12-08

## 2022-12-08 VITALS
BODY MASS INDEX: 26.23 KG/M2 | WEIGHT: 133.6 LBS | HEIGHT: 60 IN | SYSTOLIC BLOOD PRESSURE: 142 MMHG | DIASTOLIC BLOOD PRESSURE: 88 MMHG | OXYGEN SATURATION: 98 % | TEMPERATURE: 97.2 F | HEART RATE: 82 BPM

## 2022-12-08 DIAGNOSIS — I63.81 LACUNAR INFARCTION (HCC): ICD-10-CM

## 2022-12-08 DIAGNOSIS — R53.83 OTHER FATIGUE: ICD-10-CM

## 2022-12-08 DIAGNOSIS — D72.820 LYMPHOCYTOSIS: ICD-10-CM

## 2022-12-08 DIAGNOSIS — E03.9 HYPOTHYROIDISM, UNSPECIFIED TYPE: Primary | ICD-10-CM

## 2022-12-08 NOTE — LETTER
December 8, 2022     Patient: Valerie Viveros  YOB: 1965  Date of Visit: 12/8/2022      To Whom it May Concern:    Valerie Viveros is under my professional care  Shannon was seen in my office on 12/8/2022  We are currently investigating some new medical issues she is having and there are some studies still pending  I am requesting that she switch to light duty work through 12/31/22  Thank you for your consideration in this matter  We continue to follow her and hope to have a resolution to this matter in the near future  If you have any questions or concerns, please don't hesitate to call           Sincerely,          Manav Medley DO        CC: No Recipients

## 2022-12-08 NOTE — PROGRESS NOTES
Assessment/Plan: Note provided for work for light duty  Recommend follow-up with hematologist regarding abnormal CBC  Recommend recheck TSH again in approximately 3 weeks and adjust thyroid medication at that time  Await MRI results and Holter monitor results  1  Hypothyroidism, unspecified type    2  Lymphocytosis  -     Ambulatory Referral to Hematology / Oncology; Future    3  Other fatigue    4  Lacunar infarction [facility])          Subjective:      Patient ID: Baldomero Falk is a 61 y o  female  Patient with previous lacunar infarction has had a few weeks of intermittent symptoms of rapid onset fatigue and feelings of being anxious and jittery  She has not yet followed up with hematologist regarding mildly abnormal CBC  Denies any fevers or night sweats  She has Holter monitor and MRI pending  We also recently adjusted her thyroid medication as her thyroid unction was off and this may be playing a role in her symptoms  Patient needs note for work as she would like to switch to light duty for a few weeks until we are able to get an answer as to why she is having her current symptoms  Headache  Dizziness  Associated symptoms include headaches and vomiting  Vomiting   Associated symptoms include dizziness and headaches  The following portions of the patient's history were reviewed and updated as appropriate: allergies, current medications, past family history, past medical history, past social history, past surgical history, and problem list     Review of Systems   Gastrointestinal: Positive for vomiting  Neurological: Positive for dizziness and headaches           Objective:      /88 (BP Location: Left arm, Patient Position: Sitting, Cuff Size: Standard)   Pulse 82   Temp (!) 97 2 °F (36 2 °C) (Tympanic)   Ht 5' (1 524 m)   Wt 60 6 kg (133 lb 9 6 oz)   LMP  (LMP Unknown)   SpO2 98%   BMI 26 09 kg/m²          Physical Exam

## 2022-12-09 ENCOUNTER — HOSPITAL ENCOUNTER (OUTPATIENT)
Dept: NON INVASIVE DIAGNOSTICS | Facility: HOSPITAL | Age: 57
Discharge: HOME | End: 2022-12-09

## 2022-12-09 DIAGNOSIS — I63.81 LACUNAR INFARCTION (HCC): ICD-10-CM

## 2022-12-09 DIAGNOSIS — R55 NEAR SYNCOPE: ICD-10-CM

## 2022-12-11 ENCOUNTER — TELEPHONE (OUTPATIENT)
Dept: OTHER | Facility: OTHER | Age: 57
End: 2022-12-11

## 2022-12-11 NOTE — TELEPHONE ENCOUNTER
MRI report is back -  Subacute lacunar infarct -  Chart reviewed-  Was seen 12/8 -   Is on ASA -  Will forward report to Dr Nancy Waldrop to follow up tomorrow

## 2022-12-14 DIAGNOSIS — I63.81 LACUNAR INFARCTION (HCC): Primary | ICD-10-CM

## 2022-12-30 ENCOUNTER — TELEPHONE (OUTPATIENT)
Dept: ADMINISTRATIVE | Facility: OTHER | Age: 57
End: 2022-12-30

## 2022-12-30 NOTE — TELEPHONE ENCOUNTER
12/30/22 1:50 PM    The patient was called and a message was left to call the ordering provider's office regarding an open order  Thank you    Patti Eli, [address] [place]  [facility]

## 2023-01-18 ENCOUNTER — TELEPHONE (OUTPATIENT)
Dept: NEUROLOGY | Facility: CLINIC | Age: 58
End: 2023-01-18

## 2023-01-18 ENCOUNTER — CONSULT (OUTPATIENT)
Dept: HEMATOLOGY ONCOLOGY | Facility: CLINIC | Age: 58
End: 2023-01-18

## 2023-01-18 VITALS
TEMPERATURE: 98 F | DIASTOLIC BLOOD PRESSURE: 74 MMHG | RESPIRATION RATE: 16 BRPM | SYSTOLIC BLOOD PRESSURE: 114 MMHG | BODY MASS INDEX: 26.5 KG/M2 | HEART RATE: 83 BPM | OXYGEN SATURATION: 98 % | WEIGHT: 135 LBS | HEIGHT: 60 IN

## 2023-01-18 DIAGNOSIS — D72.820 LYMPHOCYTOSIS: ICD-10-CM

## 2023-01-18 NOTE — TELEPHONE ENCOUNTER
Pt stopped by the office and is asking to be contacted to schedule new pt appt  She stated she works at night and wishes to be called late afternoon due to sleeping  Patient stated she is in need to be seen due to stroke dx  Patient stated she can be reached at number on chart

## 2023-01-18 NOTE — PROGRESS NOTES
Hematology/Oncology Outpatient Follow-up  My Benites 61 y o  female 1965 [id number]    Date:  1/18/2023      Assessment and Plan:  1  Lymphocytosis  61year old female for lymphocytosis  This is very mild  She has no palpable lymphadenopathy, no symptoms specific to CLL, remainder of CBC is normal   Recommend ruling out CLL  We will review over the phone via telemedicine visit in 2 weeks    - Ambulatory Referral to Hematology / Oncology  - Leukemia/Lymphoma flow cytometry; Future    2  Dizziness  Patient has been having worsening dizziness and room spinning for 3 weeks  She was called by her PCP office today while in our office regarding her MRI from December  Reviewed that this showed a subacute appearing foci of the lacunar ischemia of the right occipital lobe  She is to contact neurology for a visit  Reviewed that it does take a long time often to get in  The neurology office is here in this building  I suggested she walked into the office today to see if they can place her on a cancellation appointment today  Also reviewed she needs to communicate with her PCP  Additionally if she does not have a neurology appointment today she should be evaluated in the emergency department  Reviewed that she is not be driving with any symptoms of dizziness  HPI:  53-year-old female presents for consultation regarding lymphocytosis  This has been going on for some time as below and is very mild  Past medical history significant for hypothyroid with Hashimoto's thyroiditis  Weight loss has been intentional; related to her thyroid     ROS: Review of Systems   Constitutional: Negative for activity change, appetite change, chills, fatigue, fever and unexpected weight change  Denies night sweats    Respiratory: Negative for cough and shortness of breath  Cardiovascular: Negative for leg swelling  Gastrointestinal: Negative for abdominal pain, constipation, diarrhea, nausea and vomiting  Genitourinary: Negative for difficulty urinating, dysuria and hematuria  Musculoskeletal: Negative for arthralgias  Skin: Negative  Neurological: Positive for dizziness  Negative for weakness, light-headedness, numbness and headaches  Brain fog x 3 weeks   Hematological: Negative  Psychiatric/Behavioral: Negative          Past Medical History:   Diagnosis Date   • Anxiety    • Hypothyroid        Past Surgical History:   Procedure Laterality Date   • TOTAL ABDOMINAL HYSTERECTOMY W/ BILATERAL SALPINGOOPHORECTOMY      Resolved: 8/2008       Social History     Socioeconomic History   • Marital status: /Civil Union     Spouse name: None   • Number of children: None   • Years of education: None   • Highest education level: None   Occupational History   • None   Tobacco Use   • Smoking status: Every Day     Packs/day: 0 50     Years: 20 00     Pack years: 10 00     Types: Cigarettes   • Smokeless tobacco: Never   Vaping Use   • Vaping Use: Never used   Substance and Sexual Activity   • Alcohol use: No   • Drug use: No   • Sexual activity: None   Other Topics Concern   • None   Social History Narrative   • None     Social Determinants of Health     Financial Resource Strain: Not on file   Food Insecurity: Not on file   Transportation Needs: Not on file   Physical Activity: Not on file   Stress: Not on file   Social Connections: Not on file   Intimate Partner Violence: Not on file   Housing Stability: Not on file       Family History   Problem Relation Age of Onset   • Migraines Paternal Grandfather    • No Known Problems Mother    • No Known Problems Father        Allergies   Allergen Reactions   • Vancomycin Itching and Other (See Comments)     Facial redness         Current Outpatient Medications:   •  FLUoxetine (PROzac) 20 mg capsule, Take 1 capsule (20 mg total) by mouth daily, Disp: 90 capsule, Rfl: 3  •  levothyroxine 150 mcg tablet, TAKE 1 TABLET (150 MCG TOTAL) BY MOUTH DAILY IN THE EARLY MORNING, Disp: 90 tablet, Rfl: 1      Physical Exam:  /74 (BP Location: Left arm, Patient Position: Sitting, Cuff Size: Standard)   Pulse 83   Temp 98 °F (36 7 °C) (Temporal)   Resp 16   Ht 5' (1 524 m)   Wt 61 2 kg (135 lb)   LMP  (LMP Unknown)   SpO2 98%   BMI 26 37 kg/m²     Physical Exam  Vitals reviewed  Constitutional:       General: She is not in acute distress  Appearance: She is well-developed  She is not ill-appearing  HENT:      Head: Normocephalic and atraumatic  Eyes:      General: No scleral icterus  Conjunctiva/sclera: Conjunctivae normal    Cardiovascular:      Rate and Rhythm: Normal rate and regular rhythm  Heart sounds: Normal heart sounds  No murmur heard  Pulmonary:      Effort: Pulmonary effort is normal  No respiratory distress  Breath sounds: Normal breath sounds  Abdominal:      Palpations: Abdomen is soft  Tenderness: There is no abdominal tenderness  Musculoskeletal:         General: No tenderness  Normal range of motion  Cervical back: Normal range of motion and neck supple  Right lower leg: No edema  Left lower leg: No edema  Lymphadenopathy:      Cervical: No cervical adenopathy  Skin:     General: Skin is warm and dry  Neurological:      Mental Status: She is alert and oriented to person, place, and time  Cranial Nerves: No cranial nerve deficit  Psychiatric:         Mood and Affect: Mood normal          Behavior: Behavior normal            Labs:            Patient voiced understanding and agreement in the above discussion  Aware to contact our office with questions/symptoms in the interim  This note has been generated by voice recognition software system  Therefore, there may be spelling, grammar, and or syntax errors  Please contact if questions arise

## 2023-01-23 ENCOUNTER — APPOINTMENT (OUTPATIENT)
Dept: LAB | Age: 58
End: 2023-01-23

## 2023-01-23 DIAGNOSIS — D72.820 LYMPHOCYTOSIS: ICD-10-CM

## 2023-01-23 DIAGNOSIS — E03.9 HYPOTHYROIDISM, UNSPECIFIED TYPE: ICD-10-CM

## 2023-01-23 LAB
T4 FREE SERPL-MCNC: 1.28 NG/DL (ref 0.76–1.46)
TSH SERPL DL<=0.05 MIU/L-ACNC: 0.08 UIU/ML (ref 0.45–4.5)

## 2023-01-24 DIAGNOSIS — E03.9 HYPOTHYROIDISM, UNSPECIFIED TYPE: ICD-10-CM

## 2023-01-24 LAB — SCAN RESULT: NORMAL

## 2023-01-24 RX ORDER — LEVOTHYROXINE SODIUM 137 UG/1
137 TABLET ORAL
Qty: 90 TABLET | Refills: 1 | Status: SHIPPED | OUTPATIENT
Start: 2023-01-24 | End: 2023-03-29

## 2023-01-26 ENCOUNTER — TELEPHONE (OUTPATIENT)
Dept: NEUROLOGY | Facility: CLINIC | Age: 58
End: 2023-01-26

## 2023-01-26 NOTE — TELEPHONE ENCOUNTER
Tanja,     Pt has called back and re-scheduled today's appointment at 12:15 pm as she is unable to make on time dueto waking up late      I was able to re-schedule to 02/03/23, [facility] VL with Adonay Mas, 10:15am     Thank you,     Giovana Balderrama

## 2023-01-26 NOTE — TELEPHONE ENCOUNTER
I called over to [facility] and Chrys Habermann is uploading the images now to send them over  Ill call [facility] office now to have them upload into PACS for review  Charts are completed

## 2023-01-26 NOTE — TELEPHONE ENCOUNTER
ADD ON/STAT Per Dr Ann Ramos triage response, patient is scheduled with Abraham Mahoney at 12:15 pm today in [facility]

## 2023-02-03 ENCOUNTER — OFFICE VISIT (OUTPATIENT)
Dept: NEUROLOGY | Facility: CLINIC | Age: 58
End: 2023-02-03

## 2023-02-03 VITALS
HEART RATE: 84 BPM | HEIGHT: 60 IN | WEIGHT: 131.8 LBS | BODY MASS INDEX: 25.87 KG/M2 | SYSTOLIC BLOOD PRESSURE: 134 MMHG | TEMPERATURE: 96.9 F | DIASTOLIC BLOOD PRESSURE: 67 MMHG

## 2023-02-03 DIAGNOSIS — R42 VERTIGO: ICD-10-CM

## 2023-02-03 DIAGNOSIS — Z86.73 HISTORY OF STROKE: Primary | ICD-10-CM

## 2023-02-03 DIAGNOSIS — I63.81 LACUNAR INFARCTION (HCC): ICD-10-CM

## 2023-02-03 DIAGNOSIS — G43.709 CHRONIC MIGRAINE WITHOUT AURA: ICD-10-CM

## 2023-02-03 DIAGNOSIS — R41.3 MEMORY DIFFICULTIES: ICD-10-CM

## 2023-02-03 RX ORDER — MECLIZINE HCL 12.5 MG/1
12.5 TABLET ORAL DAILY
Qty: 30 TABLET | Refills: 0 | Status: SHIPPED | OUTPATIENT
Start: 2023-02-03

## 2023-02-03 RX ORDER — ASPIRIN 81 MG/1
81 TABLET ORAL DAILY
Qty: 90 TABLET | Refills: 0 | Status: SHIPPED | OUTPATIENT
Start: 2023-02-03

## 2023-02-03 RX ORDER — ATORVASTATIN CALCIUM 40 MG/1
40 TABLET, FILM COATED ORAL DAILY
Qty: 30 TABLET | Refills: 3 | Status: SHIPPED | OUTPATIENT
Start: 2023-02-03

## 2023-02-03 NOTE — ASSESSMENT & PLAN NOTE
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

## 2023-02-03 NOTE — ASSESSMENT & PLAN NOTE
- Ongoing vertigo for several years  - Can be triggered by movement or not, and can last minutes to hours  Can also be accompanied by chest pain  - Per patient, she has been evaluated by ENT, ophthalmology, and has completed vestibulocochlear rehab in the past   -She refused [place]/HINTS exam in the office today  Reports that she has had this done in the past and threw her into an episode of vertigo that lasted over an hour   -We will trial 12 5 mg of meclizine to see if this helps which she is amendable to

## 2023-02-03 NOTE — PATIENT INSTRUCTIONS
For ongoing stroke prevention  - Start Aspirin and atorvastatin   - Please obtain updated lipid panel and hemoglobin A1c   - Please complete CTA of the head   - Can trial meclizine for dizziness, if unable to tolerate please let me know   - Visit cardiology   - Recommend to check blood pressure occasionally away from the doctor's office to make sure that those numbers are typically less than 130/80  If they are frequently higher than that, we recommend checking a little more often and to follow up with primary care team   - Will defer to primary care team for monitoring of cholesterol panel and blood sugar numbers with target LDL cholesterol of less than 70 and hemoglobin A1c less than 7%  - Recommend following a low salt, mediterranean diet   - Recommend routine physical exercise as tolerated     We will plan for her to return to the office in 3 months but would be happy to see her sooner if the need should arise  If she has any symptoms concerning for TIA or stroke including sudden painless loss of vision or double vision, difficulty speaking or swallowing, vertigo/room spinning that does not quickly resolve, or weakness/numbness/loss of coordination affecting 1 side of the face or body she should proceed by ambulance to the nearest emergency room immediately

## 2023-02-03 NOTE — PROGRESS NOTES
Patient ID: Ava Alexander is a 61 y o  female  who presents for initial consultation  History of lacunar infarcts with no residual deficits  Assessment/Plan:    Chronic migraine without aura  - Reports history of chronic migraines   - Discussed some treatment options with her today, however, she reports that she is comfortable with just tylenol and feels she is overall stable with her headaches and would like to defer additional treatment at this time     History of stroke  Imaging  MRI brain 12/07/2022:  "Subacute appearing foci of lacunar ischemia within the right occipital lobe as described above  No associated hemorrhage  Scattered chronic microvascular ischemic change and chronic lacunar infarcts as described above "  Carotid Ultrasound 4/19/2022  Impression  RIGHT:There is <50% stenosis noted in the internal carotid artery  Plaque is heterogenous and irregular  Vertebral artery flow is antegrade  There is no significant subclavian artery disease  LEFT:There is <50% stenosis noted in the internal carotid artery  Plaque is heterogenous and irregular  Vertebral artery flow is antegrade  There is no significant subclavian artery disease     - Will obtain CTA   - Reports having been on aspirin at some point for a short time but has not been taking it for at least a year  - Recommend restarting Aspirin 81 mg   - Most recent  11/10/2022, ordered repeat lipid panel  Will start atorvastatin 40 mg daily, depending on up to date LDL will increase    - Advised her to follow with cardiology to rule out potential central embolic cause of strokes  She has also been having ongoing chest pain  Completed holter monitor with revealed NSR  May benefit from extended cardiac monitoring  Vertigo  - Ongoing vertigo for several years  - Can be triggered by movement or not, and can last minutes to hours  Can also be accompanied by chest pain     - Per patient, she has been evaluated by ENT, ophthalmology, and has completed vestibulocochlear rehab in the past   -She refused [facility]-Hall[facility]/HINTS exam in the office today  Reports that she has had this done in the past and threw her into an episode of vertigo that lasted over an hour   -We will trial 12 5 mg of meclizine to see if this helps which she is amendable to  Memory difficulties  - Memory difficulties may be multifactorial    - She has been experiencing stress, and does report a history of anxiety and is concerned for her health  - Discussed things that we know are good for memory such as remaining physically and socially active and decreasing stress  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    - Will continue to monitor at upcoming visits  Subjective:    HPI    Headaches:   - Started in her teens with severe migraines  - Headaches: 3-4 times per week, migraines: 2 times per month   - Located: headaches: bitemporal, migraines: forehead   - headaches: dull pain and light sensitivity, dizziness; migraines: debilitating quiet dark room, no lights, noise sensitive, nausea, dizziness   - Migraine: will get spots and floaters at the start of a migraine   - Will take tylenol for her headaches every 6-8 hours  About 3 times per week  Does slightly help  Will take at the first sign that she is getting a migraine/headahce   - History of anxiety   - She now works night shift which makes it hard for her to sleep   - Water: 2 16 oz water bottles, makes her nauseous   -Triggers: going into work because of all the light and noise, loud tv, certain sounds       Dizziness:  - Constant with movement and bending over   - She could just be sitting there and will be lightheaded and will feel like the room is spinning   - No falls, denies LOC   - Dizziness can last 4 hours   - She has seen ENT and ophthalmology with no significant findings   - She will try to sit down that will just lead to nausea and will have to relax   - during this time she just feels pressure in her head and will have chest pain   - if she exerts herself will also have chest pain   - She will also experience shortness of breath wit her chest pain  Reports continuous chest pain   - Will happen daily, lasting a few minutes  Has been happening for some time  - Feels like it is being tightened all the way across usually on the right side  - She also finds that when she is just sitting there she will start sweating     Confusion:  - having conversations, and then forgetting about the conversation   - she will space out but is not unresponsive   - she is paying attention   - Forgetful with dates and appointments   - She is able to complete all her ADLs independently, able to drive okay   - She reports that she wanders, will go somewhere and forget what she is doing   - Has been placing items in weird locations like the milk in the cabinet and pills in the fridge   - No trouble remembering tasks   - Will get distracted a lot   - She can be forgetful with paying bills     Anxiety:  - she reports has had anxiety for years   - Everything makes her anxious and feels like she cant do enough   - History of anxiety attacks in the past   - Currently on prozac    The following portions of the patient's history were reviewed and updated as appropriate: allergies, current medications, past family history, past medical history, past social history, past surgical history and problem list           Objective:    Blood pressure 134/67, pulse 84, temperature (!) 96 9 °F (36 1 °C), temperature source Temporal, height 5' (1 524 m), weight 59 8 kg (131 lb 12 8 oz)      Lab Results   Component Value Date/Time    CHOLESTEROL 291 (H) 11/10/2020 11:56 AM     Lab Results   Component Value Date/Time    TRIG 341 (H) 11/10/2020 11:56 AM     Lab Results   Component Value Date/Time    HDL 40 (L) 11/10/2020 11:56 AM     Lab Results   Component Value Date/Time    LDLCALC 193 (H) 11/10/2020 11:56 AM       Lab Results   Component Value Date/Time    HGBA1C 6 8 (H) 09/17/2018 02:22 PM     Lab Results   Component Value Date/Time     09/17/2018 02:22 PM       Neurological Exam  Awake, alert, oriented, and in no apparent distress  Mood is appropriate to situation  Speech is fluent with no dysarthria or aphasia  Cranial nerves 2-12 were symmetrically intact bilaterally  Motor testing reveals 5/5 strength in the bilateral upper and lower extremities  Sensation is intact to light touch in the bilateral upper and lower extremities  Coordination intact, no drift present  Finger-to-nose absent for tremor, dysmetria, or ataxia  DTRs are symmetric and intact bilaterally  Able to rise without assistance, gait is stable  Romberg negative  Review of Systems  Constitutional: Negative  Negative for appetite change and fever  HENT: Negative  Negative for hearing loss, tinnitus, trouble swallowing and voice change  Eyes: Positive for photophobia  Negative for pain and visual disturbance  Respiratory: Negative  Negative for shortness of breath  Cardiovascular: Negative  Negative for palpitations  Gastrointestinal: Negative  Negative for nausea and vomiting  Endocrine: Negative  Negative for cold intolerance  Genitourinary: Negative  Negative for dysuria, frequency and urgency  Musculoskeletal: Negative  Negative for gait problem, myalgias and neck pain  Skin: Negative  Negative for rash  Allergic/Immunologic: Negative  Neurological: Positive for dizziness, tremors, light-headedness and headaches  Negative for seizures, syncope, facial asymmetry, speech difficulty, weakness and numbness  Hematological: Negative  Does not bruise/bleed easily  Psychiatric/Behavioral: Positive for confusion  Negative for hallucinations and sleep disturbance  I personally reviewed the ROS that was entered by the medical assistant       I have spent 50 minutes with Patient  today in which greater than 50% of this time was spent in counseling/coordination of care regarding Diagnostic results, Prognosis, Risks and benefits of tx options, Intructions for management, Patient and family education, Importance of tx compliance, Risk factor reductions, Impressions and Plan of care as above

## 2023-02-03 NOTE — ASSESSMENT & PLAN NOTE
- Reports history of chronic migraines   - Discussed some treatment options with her today, however, she reports that she is comfortable with just tylenol and feels she is overall stable with her headaches and would like to defer additional treatment at this time

## 2023-02-03 NOTE — ASSESSMENT & PLAN NOTE
Imaging  · MRI brain 12/07/2022:  "Subacute appearing foci of lacunar ischemia within the right occipital lobe as described above  No associated hemorrhage  Scattered chronic microvascular ischemic change and chronic lacunar infarcts as described above "  · Carotid Ultrasound 4/19/2022  Impression  RIGHT:There is <50% stenosis noted in the internal carotid artery  Plaque is heterogenous and irregular  Vertebral artery flow is antegrade  There is no significant subclavian artery disease  LEFT:There is <50% stenosis noted in the internal carotid artery  Plaque is heterogenous and irregular  Vertebral artery flow is antegrade  There is no significant subclavian artery disease     - Will obtain CTA   - Reports having been on aspirin at some point for a short time but has not been taking it for at least a year  - Recommend restarting Aspirin 81 mg   - Most recent  11/10/2022, ordered repeat lipid panel  Will start atorvastatin 40 mg daily, depending on up to date LDL will increase    - Advised her to follow with cardiology to rule out potential central embolic cause of strokes  She has also been having ongoing chest pain  Completed holter monitor with revealed NSR  May benefit from extended cardiac monitoring

## 2023-02-07 ENCOUNTER — TELEPHONE (OUTPATIENT)
Dept: HEMATOLOGY ONCOLOGY | Facility: CLINIC | Age: 58
End: 2023-02-07

## 2023-02-21 ENCOUNTER — TELEPHONE (OUTPATIENT)
Dept: NEUROLOGY | Facility: CLINIC | Age: 58
End: 2023-02-21

## 2023-02-21 NOTE — TELEPHONE ENCOUNTER
Pt left message  I saw neurologist on 2/3 and she never send my prescription to my pharmacy Lipitor,Antivert,Aspirin, I am still waiting for it , I left message last week about it  So please give me a call back  Called pharmacy and confirmed that they have the scripts  Called pt back and made her aware that prescriptions were sent on 2/3/23 and I just confirmed with pharmacy, pt verbalized understanding and will follow up with pharmacy

## 2023-03-05 ENCOUNTER — HOSPITAL ENCOUNTER (OUTPATIENT)
Dept: CT IMAGING | Facility: HOSPITAL | Age: 58
Discharge: HOME | End: 2023-03-05

## 2023-03-05 DIAGNOSIS — Z86.73 HISTORY OF STROKE: ICD-10-CM

## 2023-03-05 RX ADMIN — IOHEXOL 85 ML: 350 INJECTION, SOLUTION INTRAVENOUS at 10:14

## 2023-03-07 ENCOUNTER — OFFICE VISIT (OUTPATIENT)
Dept: FAMILY MEDICINE CLINIC | Facility: CLINIC | Age: 58
End: 2023-03-07

## 2023-03-07 VITALS
WEIGHT: 129.2 LBS | HEART RATE: 94 BPM | BODY MASS INDEX: 25.36 KG/M2 | TEMPERATURE: 97.4 F | HEIGHT: 60 IN | DIASTOLIC BLOOD PRESSURE: 78 MMHG | SYSTOLIC BLOOD PRESSURE: 110 MMHG | OXYGEN SATURATION: 98 %

## 2023-03-07 DIAGNOSIS — K52.9 GASTROENTERITIS: Primary | ICD-10-CM

## 2023-03-07 DIAGNOSIS — F41.1 GENERALIZED ANXIETY DISORDER: ICD-10-CM

## 2023-03-07 RX ORDER — FLUOXETINE HYDROCHLORIDE 20 MG/1
20 CAPSULE ORAL DAILY
Qty: 90 CAPSULE | Refills: 3 | Status: SHIPPED | OUTPATIENT
Start: 2023-03-07

## 2023-03-07 RX ORDER — ONDANSETRON HYDROCHLORIDE 8 MG/1
8 TABLET, FILM COATED ORAL EVERY 8 HOURS PRN
Qty: 20 TABLET | Refills: 0 | Status: SHIPPED | OUTPATIENT
Start: 2023-03-07

## 2023-03-07 NOTE — PROGRESS NOTES
Assessment/Plan: Patient will call with any new persisting or worsening symptoms  Patient also continues to be worked up for symptoms of a TIA and has no new symptoms but just recently had CT scan done  Results are pending  She does need an updated note for light duty work  Note provided  Time spent counseling reviewing treatment plan coordinating care and documentation was 30 minutes  1  Gastroenteritis  -     ondansetron (ZOFRAN) 8 mg tablet; Take 1 tablet (8 mg total) by mouth every 8 (eight) hours as needed for nausea or vomiting    2  Generalized anxiety disorder  -     FLUoxetine (PROzac) 20 mg capsule; Take 1 capsule (20 mg total) by mouth daily          Subjective:      Patient ID: Issac Silver is a 61 y o  female  Patient with nausea vomiting and diarrhea over the last 24 hours  Patient has other family members with similar symptoms  Vomiting has resolved but she still has loose and watery diarrhea  Denies any cough  No fevers  Appetite is diminished  Diarrhea   Associated symptoms include vomiting  Vomiting   Associated symptoms include diarrhea and dizziness  Dizziness  Associated symptoms include vomiting  The following portions of the patient's history were reviewed and updated as appropriate: allergies, current medications, past family history, past medical history, past social history, past surgical history, and problem list     Review of Systems   Constitutional: Negative  HENT: Negative  Eyes: Negative  Respiratory: Negative  Cardiovascular: Negative  Gastrointestinal: Positive for diarrhea and vomiting  Endocrine: Negative  Genitourinary: Negative  Musculoskeletal: Negative  Skin: Negative  Allergic/Immunologic: Negative  Neurological: Positive for dizziness  Hematological: Negative  Psychiatric/Behavioral: Negative            Objective:      /78 (BP Location: Left arm, Patient Position: Sitting, Cuff Size: Standard) Pulse 94   Temp (!) 97 4 °F (36 3 °C) (Temporal)   Ht 5' (1 524 m)   Wt 58 6 kg (129 lb 3 2 oz)   LMP  (LMP Unknown)   SpO2 98%   BMI 25 23 kg/m²          Physical Exam  Vitals reviewed  Constitutional:       Appearance: She is well-developed  HENT:      Head: Normocephalic and atraumatic  Right Ear: External ear normal  Tympanic membrane is not erythematous or bulging  Left Ear: External ear normal  Tympanic membrane is not erythematous or bulging  Nose: Nose normal       Mouth/Throat:      Mouth: No oral lesions  Pharynx: No oropharyngeal exudate  Eyes:      General: No scleral icterus  Right eye: No discharge  Left eye: No discharge  Conjunctiva/sclera: Conjunctivae normal    Neck:      Thyroid: No thyromegaly  Cardiovascular:      Rate and Rhythm: Normal rate and regular rhythm  Heart sounds: Normal heart sounds  No murmur heard  No friction rub  No gallop  Pulmonary:      Effort: Pulmonary effort is normal  No respiratory distress  Breath sounds: No wheezing or rales  Chest:      Chest wall: No tenderness  Abdominal:      General: Bowel sounds are normal  There is no distension  Palpations: Abdomen is soft  There is no mass  Tenderness: There is no abdominal tenderness  There is no guarding or rebound  Musculoskeletal:         General: No tenderness or deformity  Normal range of motion  Cervical back: Normal range of motion and neck supple  Lymphadenopathy:      Cervical: No cervical adenopathy  Skin:     General: Skin is warm and dry  Coloration: Skin is not pale  Findings: No erythema or rash  Neurological:      Mental Status: She is alert and oriented to person, place, and time  Cranial Nerves: No cranial nerve deficit  Motor: No abnormal muscle tone  Coordination: Coordination normal       Deep Tendon Reflexes: Reflexes are normal and symmetric     Psychiatric:         Behavior: Behavior normal

## 2023-03-07 NOTE — LETTER
March 7, 2023     Patient: Nicolle Gusman  YOB: 1965  Date of Visit: 3/7/2023      To Whom it May Concern:    Nicolle Gusman is under my professional care  Shannon was seen in my office on 3/7/2023  Shannon is still being followed for chronic medical conditions including a recent TIA  We recommend work week to be light duty with no lifting greater than 25 pounds and no more than 4 and a half days per week  If you have any questions or concerns, please don't hesitate to call           Sincerely,          Yoselyn Mccallum DO        CC: No Recipients

## 2023-03-28 ENCOUNTER — TELEPHONE (OUTPATIENT)
Dept: NEUROLOGY | Facility: CLINIC | Age: 58
End: 2023-03-28

## 2023-03-28 NOTE — TELEPHONE ENCOUNTER
LMOM for the patient to call us back to reschedule appt with Radha Herrera since she will be out of the office

## 2023-03-29 DIAGNOSIS — E03.9 HYPOTHYROIDISM, UNSPECIFIED TYPE: ICD-10-CM

## 2023-03-29 RX ORDER — LEVOTHYROXINE SODIUM 137 UG/1
137 TABLET ORAL
Qty: 90 TABLET | Refills: 2 | Status: SHIPPED | OUTPATIENT
Start: 2023-03-29

## 2023-07-18 ENCOUNTER — OFFICE VISIT (OUTPATIENT)
Dept: FAMILY MEDICINE CLINIC | Facility: CLINIC | Age: 58
End: 2023-07-18
Payer: COMMERCIAL

## 2023-07-18 VITALS — WEIGHT: 136 LBS | TEMPERATURE: 98.6 F | HEIGHT: 60 IN | BODY MASS INDEX: 26.7 KG/M2

## 2023-07-18 DIAGNOSIS — G43.709 CHRONIC MIGRAINE WITHOUT AURA WITHOUT STATUS MIGRAINOSUS, NOT INTRACTABLE: ICD-10-CM

## 2023-07-18 DIAGNOSIS — F41.1 GENERALIZED ANXIETY DISORDER: ICD-10-CM

## 2023-07-18 DIAGNOSIS — Z00.00 WELL ADULT EXAM: Primary | ICD-10-CM

## 2023-07-18 PROCEDURE — 99396 PREV VISIT EST AGE 40-64: CPT | Performed by: FAMILY MEDICINE

## 2023-07-18 RX ORDER — PREDNISONE 10 MG/1
TABLET ORAL
Qty: 33 TABLET | Refills: 0 | Status: SHIPPED | OUTPATIENT
Start: 2023-07-18

## 2023-07-18 RX ORDER — RIZATRIPTAN BENZOATE 10 MG/1
10 TABLET ORAL ONCE AS NEEDED
Qty: 9 TABLET | Refills: 0 | Status: SHIPPED | OUTPATIENT
Start: 2023-07-18

## 2023-07-18 RX ORDER — FLUOXETINE HYDROCHLORIDE 40 MG/1
40 CAPSULE ORAL DAILY
Qty: 90 CAPSULE | Refills: 1 | Status: SHIPPED | OUTPATIENT
Start: 2023-07-18

## 2023-07-18 NOTE — PROGRESS NOTES
Assessment/Plan: Consider imaging if headaches persist or worsen. Recommend recheck again in office if symptoms persist or worsen. Recommend annual mammography. Annual lab testing recommended. Recommend avoiding smoking. 1. Well adult exam    2. Chronic migraine without aura without status migrainosus, not intractable  -     rizatriptan (MAXALT) 10 mg tablet; Take 1 tablet (10 mg total) by mouth once as needed for migraine for up to 1 dose May repeat in 2 hours if needed  -     predniSONE 10 mg tablet; 6 tablets daily, all at one time, with food. Then on day #4 decrease by 1 pill each day until finished. 3. Generalized anxiety disorder  Assessment & Plan:  Patient would like to increase fluoxetine dose to 40 mg daily. We will do so. Recommend recheck in office again in 1 to 2 months or sooner if needed. She will call if any side effects or difficulty with medication. Orders:  -     FLUoxetine (PROzac) 40 MG capsule; Take 1 capsule (40 mg total) by mouth daily        Subjective:      Patient ID: Edwina Gardiner is a 61 y.o. female. Patient seen today for multiple concerns and annual checkup. Adrian Pike Her chief concern is a migraine for the last 24 hours. Symptoms are mild to moderate. Patient denies any chest pain or shortness of breath. She also notes worsening anxiety and dysphoria. She is currently on Prozac 20 mg daily and would like to consider increasing dose to 40 mg daily. No homicidality suicidality hallucinations or delusions. Migraine             The following portions of the patient's history were reviewed and updated as appropriate: allergies, current medications, past family history, past medical history, past social history, past surgical history, and problem list.    Review of Systems   Constitutional: Negative. HENT: Negative. Eyes: Negative. Respiratory: Negative. Cardiovascular: Negative. Gastrointestinal: Negative. Endocrine: Negative.     Genitourinary: Negative. Musculoskeletal: Negative. Skin: Negative. Allergic/Immunologic: Negative. Neurological: Positive for headaches. Hematological: Negative. Psychiatric/Behavioral: Positive for dysphoric mood. Negative for agitation, behavioral problems, confusion, decreased concentration, hallucinations, self-injury, sleep disturbance and suicidal ideas. The patient is not nervous/anxious and is not hyperactive. Objective:      Temp 98.6 °F (37 °C) (Tympanic)   Ht 5' (1.524 m)   Wt 61.7 kg (136 lb)   LMP  (LMP Unknown)   BMI 26.56 kg/m²          Physical Exam  Vitals reviewed. Constitutional:       Appearance: She is well-developed. HENT:      Head: Normocephalic and atraumatic. Right Ear: External ear normal. Tympanic membrane is not erythematous or bulging. Left Ear: External ear normal. Tympanic membrane is not erythematous or bulging. Nose: Nose normal.      Mouth/Throat:      Mouth: No oral lesions. Pharynx: No oropharyngeal exudate. Eyes:      General: No scleral icterus. Right eye: No discharge. Left eye: No discharge. Conjunctiva/sclera: Conjunctivae normal.   Neck:      Thyroid: No thyromegaly. Cardiovascular:      Rate and Rhythm: Normal rate and regular rhythm. Heart sounds: Normal heart sounds. No murmur heard. No friction rub. No gallop. Pulmonary:      Effort: Pulmonary effort is normal. No respiratory distress. Breath sounds: No wheezing or rales. Chest:      Chest wall: No tenderness. Abdominal:      General: Bowel sounds are normal. There is no distension. Palpations: Abdomen is soft. There is no mass. Tenderness: There is no abdominal tenderness. There is no guarding or rebound. Musculoskeletal:         General: No tenderness or deformity. Normal range of motion. Cervical back: Normal range of motion and neck supple. Lymphadenopathy:      Cervical: No cervical adenopathy.    Skin: General: Skin is warm and dry. Coloration: Skin is not pale. Findings: No erythema or rash. Neurological:      Mental Status: She is alert and oriented to person, place, and time. Cranial Nerves: No cranial nerve deficit. Motor: No abnormal muscle tone. Coordination: Coordination normal.      Deep Tendon Reflexes: Reflexes are normal and symmetric.    Psychiatric:         Behavior: Behavior normal.

## 2023-07-18 NOTE — ASSESSMENT & PLAN NOTE
Patient would like to increase fluoxetine dose to 40 mg daily. We will do so. Recommend recheck in office again in 1 to 2 months or sooner if needed. She will call if any side effects or difficulty with medication.

## 2023-07-18 NOTE — LETTER
July 18, 2023     Patient: Janice Nicolas  YOB: 1965  Date of Visit: 7/18/2023      To Whom it May Concern:    Janice Nicolas is under my professional care. Shannon was seen in my office on 7/18/2023. Shannon is still being followed for chronic medical conditions including a recent TIA. We recommend work week to be light duty with no lifting greater than 25 pounds and no more than 4 and a half days per week. Janice Nicolas is under my professional care. If you have any questions or concerns, please don't hesitate to call.          Sincerely,          Nga Caraballo, DO        CC: No Recipients

## 2023-10-05 ENCOUNTER — TELEPHONE (OUTPATIENT)
Dept: FAMILY MEDICINE CLINIC | Facility: CLINIC | Age: 58
End: 2023-10-05

## 2023-10-05 NOTE — TELEPHONE ENCOUNTER
Call placed to patient regarding recent hospital stay and to schedule a follow-up appt.  LMOM for patient to call back

## 2023-10-12 ENCOUNTER — OFFICE VISIT (OUTPATIENT)
Dept: FAMILY MEDICINE CLINIC | Facility: CLINIC | Age: 58
End: 2023-10-12
Payer: COMMERCIAL

## 2023-10-12 VITALS
HEART RATE: 72 BPM | WEIGHT: 127 LBS | BODY MASS INDEX: 24.94 KG/M2 | TEMPERATURE: 97 F | HEIGHT: 60 IN | SYSTOLIC BLOOD PRESSURE: 106 MMHG | DIASTOLIC BLOOD PRESSURE: 63 MMHG | OXYGEN SATURATION: 99 %

## 2023-10-12 DIAGNOSIS — I25.10 CORONARY ARTERY DISEASE INVOLVING NATIVE CORONARY ARTERY OF NATIVE HEART WITHOUT ANGINA PECTORIS: Primary | ICD-10-CM

## 2023-10-12 DIAGNOSIS — Z11.4 SCREENING FOR HIV (HUMAN IMMUNODEFICIENCY VIRUS): ICD-10-CM

## 2023-10-12 DIAGNOSIS — Z11.59 NEED FOR HEPATITIS C SCREENING TEST: ICD-10-CM

## 2023-10-12 DIAGNOSIS — Z95.5 HISTORY OF PLACEMENT OF STENT IN LAD CORONARY ARTERY: ICD-10-CM

## 2023-10-12 PROCEDURE — 99495 TRANSJ CARE MGMT MOD F2F 14D: CPT | Performed by: FAMILY MEDICINE

## 2023-10-12 NOTE — PROGRESS NOTES
Assessment & Plan     1. Coronary artery disease involving native coronary artery of native heart without angina pectoris    2. Screening for HIV (human immunodeficiency virus)  -     HIV 1/2 AG/AB w Reflex [facility] for 2 yr old and above; Future    3. Need for hepatitis C screening test  -     Hepatitis C antibody; Future    4. History of placement of stent in LAD coronary artery    Patient appears to be doing well status post stent placement. Recommend follow-up with cardiology. She does have nitroglycerin tablets at home if needed. Recommend quitting smoking completely. She is trying to wean off of the smoking. She will call with any concerns in the interim. Recommend recheck in office in 6 months or sooner if needed. Subjective     Transitional Care Management Review:   See HPI above for dates of admission. During the TCM phone call patient stated: No concerns. Patient was recently hospitalized for coronary artery disease. She had stent placement done of the LAD via the right radial artery. She is gradually healing and feeling better. She still feels fatigued but denies any chest pain or shortness of breath. She is a long-term smoker and is still smoking 1 cigarette/day. She is trying to quit. She is due to follow-up with cardiologist in the coming weeks. Denies any orthopnea. No diaphoresis. To come into the office for TCM visit for review of her medications and treatment plan. Admitted to the hospital 9/29/2023 and discharged on 9/30/2023. Review of Systems   Constitutional: Negative. HENT: Negative. Eyes: Negative. Respiratory: Negative. Cardiovascular: Negative. Gastrointestinal: Negative. Endocrine: Negative. Genitourinary: Negative. Musculoskeletal: Negative. Skin: Negative. Allergic/Immunologic: Negative. Neurological: Negative. Hematological: Negative. Psychiatric/Behavioral: Negative.          Objective     /63 (BP Location: Left arm, Patient Position: Sitting, Cuff Size: Standard)   Pulse 72   Temp (!) 97 °F (36.1 °C) (Tympanic)   Ht 5' (1.524 m)   Wt 57.6 kg (127 lb)   LMP  (LMP Unknown)   SpO2 99%   BMI 24.80 kg/m²      Physical Exam  Constitutional:       General: She is not in acute distress. Appearance: She is well-developed. She is not diaphoretic. HENT:      Head: Normocephalic and atraumatic. Right Ear: External ear normal.      Left Ear: External ear normal.      Nose: Nose normal.   Eyes:      General: No scleral icterus. Right eye: No discharge. Left eye: No discharge. Conjunctiva/sclera: Conjunctivae normal.      Pupils: Pupils are equal, round, and reactive to light. Neck:      Thyroid: No thyromegaly. Vascular: No JVD. Trachea: No tracheal deviation. Cardiovascular:      Rate and Rhythm: Normal rate and regular rhythm. Heart sounds: Normal heart sounds. No murmur heard. No friction rub. No gallop. Pulmonary:      Effort: Pulmonary effort is normal. No respiratory distress. Breath sounds: Normal breath sounds. No wheezing or rales. Chest:      Chest wall: No tenderness. Abdominal:      General: Bowel sounds are normal. There is no distension. Palpations: Abdomen is soft. There is no mass. Tenderness: There is no abdominal tenderness. There is no guarding or rebound. Hernia: No hernia is present. Musculoskeletal:         General: No tenderness or deformity. Normal range of motion. Cervical back: Normal range of motion and neck supple. Lymphadenopathy:      Cervical: No cervical adenopathy. Skin:     General: Skin is warm and dry. Capillary Refill: Capillary refill takes less than 2 seconds. Coloration: Skin is not pale. Findings: No erythema or rash. Neurological:      Mental Status: She is alert and oriented to person, place, and time. Cranial Nerves: No cranial nerve deficit.       Sensory: No sensory deficit. Motor: No abnormal muscle tone.       Coordination: Coordination normal.      Deep Tendon Reflexes: Reflexes normal.   Psychiatric:         Behavior: Behavior normal.       Medications have been reviewed by provider in current encounter    Radha Moore DO

## 2023-12-27 NOTE — PROGRESS NOTES
Virtual Brief Visit    Assessment/Plan:  We discussed treatment options  She is isolating at home and recommend COVID testing  She will call with any worsening or new symptoms in the coming days as we await COVID results  Problem List Items Addressed This Visit     None      Visit Diagnoses     Screening for viral disease    -  Primary    Relevant Orders    Novel Coronavirus (COVID-19), PCR [facility] - Collected at [facility] or [facility]                Reason for visit is   Chief Complaint   Patient presents with    Virtual Brief Visit        Encounter provider Chelsea Yoo DO    Provider located at [address] [place] [address] [phone]    Recent Visits  Date Type Provider Dept   01/12/21 Isabel Burr [address], [address] recent visits within past 7 days and meeting all other requirements     Today's Visits  Date Type Provider Dept   01/13/21 Telemedicine Chelsea Yoo DO [facility], [place], operated by [facility]   Showing today's visits and meeting all other requirements     Future Appointments  No visits were found meeting these conditions  Showing future appointments within next 150 days and meeting all other requirements        After connecting through telephone, the patient was identified by name and date of birth  Lennie Castro was informed that this is a telemedicine visit and that the visit is being conducted through telephone  My office door was closed  No one else was in the room  She acknowledged consent and understanding of privacy and security of the platform  The patient has agreed to participate and understands she can discontinue the visit at any time  Patient is aware this is a billable service  Subjective    Lennie Castro is a 63 y o  female for concern about COVID exposure and congestion  Patient has COVID symptoms of congestion and achiness times 24 hours    Her daughter was recently diagnosed with COVID 3 days ago   Patient has had exposure to her daughter  Patient did have COVID vaccine about 7 days ago  No cough or shortness of breath  No past medical history on file  Past Surgical History:   Procedure Laterality Date    TOTAL ABDOMINAL HYSTERECTOMY W/ BILATERAL SALPINGOOPHORECTOMY      Resolved: 8/2008       Current Outpatient Medications   Medication Sig Dispense Refill    FLUoxetine (PROzac) 20 mg capsule Take 1 capsule (20 mg total) by mouth daily 90 capsule 3    levothyroxine 112 mcg tablet Take 1 tablet (112 mcg total) by mouth daily 90 tablet 1    Multiple Vitamins-Minerals (CENTRUM ADULTS PO) Centrum       No current facility-administered medications for this visit  Allergies   Allergen Reactions    Vancomycin Itching and Other (See Comments)     Facial redness       Review of Systems   Constitutional: Negative  Negative for fever  HENT: Positive for congestion  Eyes: Negative  Respiratory: Negative  Negative for cough  Cardiovascular: Negative  Gastrointestinal: Negative  Endocrine: Negative  Genitourinary: Negative  Musculoskeletal: Negative  Skin: Negative  Allergic/Immunologic: Negative  Neurological: Negative  Hematological: Negative  Psychiatric/Behavioral: Negative  There were no vitals filed for this visit  I spent 25 minutes with patient today in which greater than 50% of the time was spent in counseling/coordination of care regarding COVID exposure and congestion    VIRTUAL VISIT DISCLAIMER    Shannon Goran acknowledges that she has consented to an online visit or consultation  She understands that the online visit is based solely on information provided by her, and that, in the absence of a face-to-face physical evaluation by the physician, the diagnosis she receives is both limited and provisional in terms of accuracy and completeness  This is not intended to replace a full medical face-to-face evaluation by the physician  Shannon Zimmer understands and accepts these terms  sebastian

## 2024-04-12 ENCOUNTER — OFFICE VISIT (OUTPATIENT)
Dept: FAMILY MEDICINE CLINIC | Facility: CLINIC | Age: 59
End: 2024-04-12

## 2024-04-12 VITALS
HEART RATE: 77 BPM | TEMPERATURE: 97.2 F | HEIGHT: 60 IN | WEIGHT: 132 LBS | OXYGEN SATURATION: 99 % | SYSTOLIC BLOOD PRESSURE: 117 MMHG | DIASTOLIC BLOOD PRESSURE: 60 MMHG | BODY MASS INDEX: 25.91 KG/M2

## 2024-04-12 DIAGNOSIS — M25.562 ACUTE PAIN OF LEFT KNEE: Primary | ICD-10-CM

## 2024-04-12 DIAGNOSIS — I25.10 CORONARY ARTERY DISEASE INVOLVING NATIVE CORONARY ARTERY OF NATIVE HEART WITHOUT ANGINA PECTORIS: ICD-10-CM

## 2024-04-12 DIAGNOSIS — M48.02 CERVICAL SPINAL STENOSIS: ICD-10-CM

## 2024-04-12 DIAGNOSIS — F41.1 GENERALIZED ANXIETY DISORDER: ICD-10-CM

## 2024-04-12 RX ORDER — PANTOPRAZOLE SODIUM 20 MG/1
TABLET, DELAYED RELEASE ORAL
COMMUNITY
Start: 2024-03-26

## 2024-04-12 RX ORDER — FAMOTIDINE 20 MG/1
20 TABLET, FILM COATED ORAL 2 TIMES DAILY
COMMUNITY
Start: 2024-01-03

## 2024-04-12 RX ORDER — METOPROLOL SUCCINATE 50 MG/1
50 TABLET, EXTENDED RELEASE ORAL DAILY
COMMUNITY
Start: 2024-03-26

## 2024-04-12 NOTE — ASSESSMENT & PLAN NOTE
Patient continues on daily baby aspirin.  Continues with nitro as needed.  Continue follow-up with cardiology.  Continue Lipitor 40 mg daily.

## 2024-04-12 NOTE — PROGRESS NOTES
Family Medicine Follow-Up Office Visit  Shannon Zimmer 58 y.o. female   MRN: [id number] : 1965  ENCOUNTER: 2024       Assessment and Plan   1. Acute pain of left knee  Assessment & Plan:  Recommend to the patient follow-up with orthopedics as advised her sports medicine specialist.      2. Cervical spinal stenosis  Assessment & Plan:  Reviewed MRI findings from  indicating spondylitic degenerative changes including moderate central canal stenosis at C4-5.  Discussed consideration for follow-up with pain management however patient declines as this has not been helpful in the past.      3. Coronary artery disease involving native coronary artery of native heart without angina pectoris  Assessment & Plan:  Patient continues on daily baby aspirin.  Continues with nitro as needed.  Continue follow-up with cardiology.  Continue Lipitor 40 mg daily.      4. Generalized anxiety disorder  Assessment & Plan:  Follows with psychiatry.  Continues on Prozac 20 mg daily.            Depression Screening and Follow-up Plan: Patient was screened for depression during today's encounter. They screened negative with a PHQ-2 score of 1.    Tobacco Cessation Counseling: Tobacco cessation counseling was provided. The patient is sincerely urged to quit consumption of tobacco. She is not ready to quit tobacco.         Chief Complaint     Chief Complaint   Patient presents with   • Follow-up       History of Present Illness   Shannon Zimmer is a 58 y.o.-year-old female with past medical history of migraine, CAD status post LAD stent, lung nodules, hypothyroidism, DAVE, CVA and vertigo who presents today for 6-month follow-up.    Patient notes that she has recently had an issue with her left knee which was evaluated this week at [facility] sports medicine.  She notes effusion was drained at that time however her knee has filled up again.  She has been referred by their office to orthopedics due to this as well as severe  arthritis.    Patient notes that with symptoms of angina or palpitations that she does have radiation of symptoms into her neck.  She notes that her specialists did request that she have an MRI of her cervical spine to assess if this was causing her symptoms.  Reviewed results of cervical spine MRI from 2021 indicating spondylitic degenerative changes resulting in moderate central canal stenosis.  No cord compression or cord signal abnormality.  Patient reports that she did not have improvement of symptoms with pain management.  Overall feels that her symptoms are related to cardiac etiology.    Discussed smoking cessation patient is not currently ready to try quitting again at this time.  She notes she did quit for 3 weeks following her hospitalization.  Currently smoking 6 cigarettes/day.    Review of Systems   Review of Systems   Constitutional:  Negative for fatigue and fever.   HENT:  Negative for congestion and sore throat.    Respiratory:  Negative for cough and shortness of breath.    Cardiovascular:  Positive for chest pain (exertional sometimes occurs) and palpitations.   Gastrointestinal:  Negative for abdominal pain, blood in stool, constipation, diarrhea, nausea and vomiting.   Genitourinary:  Negative for dysuria and hematuria.   Musculoskeletal:  Positive for arthralgias, joint swelling and myalgias.   Skin:  Negative for rash.   Neurological:  Positive for dizziness. Negative for headaches.   Psychiatric/Behavioral:  Negative for dysphoric mood. The patient is not nervous/anxious.        Active Problem List     Patient Active Problem List   Diagnosis   • Generalized anxiety disorder   • Hashimoto's thyroiditis   • Hypothyroidism   • Lung nodules   • Lacunar infarction (HCC)   • History of stroke   • Chronic migraine without aura   • Vertigo   • Memory difficulties   • Coronary artery disease involving native coronary artery   • History of placement of stent in LAD coronary artery   • Acute pain of  left knee   • Cervical spinal stenosis       Past Medical History, Past Surgical History, Family History, and Social History were reviewed and updated today as appropriate.    Objective   /60 (BP Location: Left arm, Patient Position: Sitting, Cuff Size: Standard)   Pulse 77   Temp (!) 97.2 °F (36.2 °C) (Tympanic)   Ht 5' (1.524 m)   Wt 59.9 kg (132 lb)   LMP  (LMP Unknown)   SpO2 99%   BMI 25.78 kg/m²     Physical Exam  Vitals reviewed.   Constitutional:       General: She is not in acute distress.     Appearance: She is well-developed. She is not ill-appearing.   HENT:      Head: Normocephalic and atraumatic.      Right Ear: External ear normal.      Left Ear: External ear normal.   Eyes:      General: No scleral icterus.     Conjunctiva/sclera: Conjunctivae normal.   Cardiovascular:      Rate and Rhythm: Normal rate and regular rhythm.      Heart sounds: Normal heart sounds.   Pulmonary:      Effort: Pulmonary effort is normal. No respiratory distress.      Breath sounds: Normal breath sounds. No wheezing.   Abdominal:      General: Bowel sounds are normal. There is no distension.      Palpations: Abdomen is soft.      Tenderness: There is no abdominal tenderness.   Musculoskeletal:      Right lower leg: No edema.      Left lower leg: No edema.   Skin:     General: Skin is warm and dry.   Neurological:      General: No focal deficit present.      Mental Status: She is alert and oriented to person, place, and time.   Psychiatric:         Mood and Affect: Mood normal.         Behavior: Behavior normal.         Pertinent Laboratory/Diagnostic Studies:  Lab Results   Component Value Date    GLUCOSE 99 11/11/2015    BUN 12 01/03/2024    CREATININE 0.99 01/03/2024    CALCIUM 9.7 01/03/2024     11/11/2015    K 4.0 01/03/2024    CO2 25 01/03/2024     01/03/2024     Lab Results   Component Value Date    ALT 10 01/03/2024    AST 14 01/03/2024    ALKPHOS 59 01/03/2024    BILITOT 0.23 11/11/2015        Lab Results   Component Value Date    WBC 9.34 11/29/2022    HGB 13.6 11/29/2022    HCT 42.9 11/29/2022    MCV 93 11/29/2022     11/29/2022       Lab Results   Component Value Date    TSH 2.08 09/29/2023       Lab Results   Component Value Date    CHOL 290 10/23/2014     Lab Results   Component Value Date    TRIG 341 (H) 11/10/2020     Lab Results   Component Value Date    HDL 40 (L) 11/10/2020     Lab Results   Component Value Date    LDLCALC 193 (H) 11/10/2020     Lab Results   Component Value Date    HGBA1C 6.7 (H) 09/29/2023       Results for orders placed or performed in visit on 01/23/23   TSH, 3rd generation with Free T4 reflex   Result Value Ref Range    TSH 3RD GENERATON 0.080 (L) 0.450 - 4.500 uIU/mL   Leukemia/Lymphoma flow cytometry   Result Value Ref Range    Scan Result SEE WRITTEN REPORT    T4, free   Result Value Ref Range    Free T4 1.28 0.76 - 1.46 ng/dL       No orders of the defined types were placed in this encounter.        Current Medications     Current Outpatient Medications   Medication Sig Dispense Refill   • aluminum-magnesium hydroxide 200-200 MG/5ML suspension Take 15 mL by mouth every 6 (six) hours as needed     • aspirin (ECOTRIN LOW STRENGTH) 81 mg EC tablet Take 1 tablet (81 mg total) by mouth daily 90 tablet 0   • atorvastatin (LIPITOR) 40 mg tablet Take 1 tablet (40 mg total) by mouth daily 30 tablet 3   • clopidogrel (PLAVIX) 75 mg tablet Take 75 mg by mouth daily     • famotidine (PEPCID) 20 mg tablet Take 20 mg by mouth 2 (two) times a day     • levothyroxine 137 mcg tablet TAKE 1 TABLET (137 MCG TOTAL) BY MOUTH DAILY IN THE EARLY MORNING 90 tablet 1   • losartan (COZAAR) 25 mg tablet Take 25 mg by mouth daily     • metoprolol succinate (TOPROL-XL) 25 mg 24 hr tablet TAKE 1 TABLET (25 MG TOTAL) BY MOUTH DAILY.     • metoprolol succinate (TOPROL-XL) 50 mg 24 hr tablet Take 50 mg by mouth daily     • nitroglycerin (NITROSTAT) 0.4 mg SL tablet Place 1 tablet under the  tongue every 5 (five) minutes as needed     • pantoprazole (PROTONIX) 20 mg tablet 1 (ONE) TABLET BY MOUTH BEFORE BREAKFAST     • FLUoxetine (PROzac) 20 mg capsule  (Patient not taking: Reported on 10/6/2023)       No current facility-administered medications for this visit.       ALLERGIES:  Allergies   Allergen Reactions   • Vancomycin Itching and Other (See Comments)     Facial redness       Health Maintenance     Health Maintenance   Topic Date Due   • Hepatitis C Screening  Never done   • Pneumococcal Vaccine: Pediatrics (0 to 5 Years) and At-Risk Patients (6 to 64 Years) (1 of 2 - PCV) Never done   • HIV Screening  Never done   • Osteoporosis Screening  Never done   • Zoster Vaccine (1 of 2) Never done   • Influenza Vaccine (1) 09/01/2023   • COVID-19 Vaccine (3 - 2023-24 season) 09/01/2023   • Colorectal Cancer Screening  07/18/2024 (Originally 8/4/2009)   • Breast Cancer Screening: Mammogram  07/18/2024 (Originally 12/5/2018)   • Annual Physical  07/18/2024   • Depression Screening  04/12/2025   • DTaP,Tdap,and Td Vaccines (3 - Td or Tdap) 09/13/2032   • HIB Vaccine  Aged Out   • IPV Vaccine  Aged Out   • Hepatitis A Vaccine  Aged Out   • Meningococcal ACWY Vaccine  Aged Out   • HPV Vaccine  Aged Out     Immunization History   Administered Date(s) Administered   • COVID-19 MODERNA VACC 0.5 ML IM 01/05/2021, 03/08/2021   • INFLUENZA 11/04/2015, 10/25/2020   • Influenza, seasonal, injectable 11/04/2015   • Tdap 08/08/2013, 09/13/2022         Leticia Taylor DO   [facility]  4/12/2024  2:03 PM    Parts of this note were dictated using Dragon dictation software and may have sounds-like errors due to variation in pronunciation.

## 2024-04-12 NOTE — ASSESSMENT & PLAN NOTE
Reviewed MRI findings from 2021 indicating spondylitic degenerative changes including moderate central canal stenosis at C4-5.  Discussed consideration for follow-up with pain management however patient declines as this has not been helpful in the past.

## 2025-03-31 ENCOUNTER — TELEPHONE (OUTPATIENT)
Age: 60
End: 2025-03-31

## 2025-03-31 NOTE — TELEPHONE ENCOUNTER
Pt is scheduled for a tuberculosis skin test tomorrow but she wants someone to please call her with the price today. Please review and advise [phone] thank you.

## 2025-03-31 NOTE — TELEPHONE ENCOUNTER
Spoke with pt let her know that we will not be able to tell her price until we do the estimate and we can't do that until she is here in office checking in.

## 2025-04-01 ENCOUNTER — CLINICAL SUPPORT (OUTPATIENT)
Dept: FAMILY MEDICINE CLINIC | Facility: CLINIC | Age: 60
End: 2025-04-01

## 2025-04-01 DIAGNOSIS — Z23 NEED FOR TUBERCULOSIS VACCINATION: Primary | ICD-10-CM

## 2025-04-01 PROCEDURE — 86580 TB INTRADERMAL TEST: CPT

## 2025-04-03 ENCOUNTER — CLINICAL SUPPORT (OUTPATIENT)
Dept: FAMILY MEDICINE CLINIC | Facility: CLINIC | Age: 60
End: 2025-04-03

## 2025-04-03 DIAGNOSIS — Z11.1 PPD NEGATIVE: Primary | ICD-10-CM

## 2025-04-03 PROCEDURE — NURSE

## 2025-04-07 ENCOUNTER — TELEPHONE (OUTPATIENT)
Age: 60
End: 2025-04-07

## 2025-04-07 NOTE — TELEPHONE ENCOUNTER
Patient stated that she needs to come in earlier for her appointment and will not be able to come in on 04/14/2025. She stated that she would like to come in today or sometime this week. I called the office and was informed that the patient has to wait for 2 weeks before getting her 2nd PPD. Once I informed patient, she stated that her employer stated that she only has to wait one week after her last PPD, which her last one was on 04/01. Please verify and contact patient at [phone]. Please advise.

## 2025-04-08 ENCOUNTER — CLINICAL SUPPORT (OUTPATIENT)
Dept: FAMILY MEDICINE CLINIC | Facility: CLINIC | Age: 60
End: 2025-04-08

## 2025-04-08 DIAGNOSIS — Z23 NEED FOR TUBERCULOSIS VACCINATION: Primary | ICD-10-CM

## 2025-04-08 PROCEDURE — 86580 TB INTRADERMAL TEST: CPT

## 2025-04-10 ENCOUNTER — CLINICAL SUPPORT (OUTPATIENT)
Dept: FAMILY MEDICINE CLINIC | Facility: CLINIC | Age: 60
End: 2025-04-10

## 2025-04-10 DIAGNOSIS — Z23 NEED FOR TUBERCULOSIS VACCINATION: Primary | ICD-10-CM

## 2025-04-10 LAB
INDURATION: 0 MM
TB SKIN TEST: NEGATIVE

## 2025-04-10 PROCEDURE — NURSE

## 2025-04-10 NOTE — PROGRESS NOTES
PPD Reading Note  PPD read and results entered in [facility].  Result: 0 mm induration.  Interpretation: negative  If test not read within 48-72 hours of initial placement, patient advised to repeat in other arm 1-3 weeks after this test.  Allergic reaction: no